# Patient Record
Sex: FEMALE | Race: WHITE
[De-identification: names, ages, dates, MRNs, and addresses within clinical notes are randomized per-mention and may not be internally consistent; named-entity substitution may affect disease eponyms.]

---

## 2017-09-11 ENCOUNTER — HOSPITAL ENCOUNTER (INPATIENT)
Dept: HOSPITAL 62 - ER | Age: 22
LOS: 4 days | Discharge: HOME | DRG: 684 | End: 2017-09-15
Attending: INTERNAL MEDICINE | Admitting: FAMILY MEDICINE
Payer: COMMERCIAL

## 2017-09-11 DIAGNOSIS — Z88.1: ICD-10-CM

## 2017-09-11 DIAGNOSIS — K21.9: ICD-10-CM

## 2017-09-11 DIAGNOSIS — D64.9: ICD-10-CM

## 2017-09-11 DIAGNOSIS — N17.9: Primary | ICD-10-CM

## 2017-09-11 DIAGNOSIS — Z87.11: ICD-10-CM

## 2017-09-11 DIAGNOSIS — Z91.018: ICD-10-CM

## 2017-09-11 DIAGNOSIS — J45.909: ICD-10-CM

## 2017-09-11 DIAGNOSIS — E87.5: ICD-10-CM

## 2017-09-11 DIAGNOSIS — Z79.899: ICD-10-CM

## 2017-09-11 DIAGNOSIS — E86.0: ICD-10-CM

## 2017-09-11 LAB
ALBUMIN SERPL-MCNC: 4.6 G/DL (ref 3.5–5)
ALP SERPL-CCNC: 94 U/L (ref 38–126)
ALT SERPL-CCNC: 28 U/L (ref 9–52)
ANION GAP SERPL CALC-SCNC: 16 MMOL/L (ref 5–19)
APPEARANCE UR: CLEAR
AST SERPL-CCNC: 31 U/L (ref 14–36)
BASOPHILS # BLD AUTO: 0 10^3/UL (ref 0–0.2)
BASOPHILS NFR BLD AUTO: 0.4 % (ref 0–2)
BILIRUB DIRECT SERPL-MCNC: 0.4 MG/DL (ref 0–0.4)
BILIRUB SERPL-MCNC: 0.7 MG/DL (ref 0.2–1.3)
BILIRUB UR QL STRIP: NEGATIVE
BUN SERPL-MCNC: 30 MG/DL (ref 7–20)
CALCIUM: 9.9 MG/DL (ref 8.4–10.2)
CHLORIDE SERPL-SCNC: 101 MMOL/L (ref 98–107)
CO2 SERPL-SCNC: 23 MMOL/L (ref 22–30)
CREAT SERPL-MCNC: 3.92 MG/DL (ref 0.52–1.25)
EOSINOPHIL # BLD AUTO: 0.3 10^3/UL (ref 0–0.6)
EOSINOPHIL NFR BLD AUTO: 2.2 % (ref 0–6)
ERYTHROCYTE [DISTWIDTH] IN BLOOD BY AUTOMATED COUNT: 14.6 % (ref 11.5–14)
GLUCOSE SERPL-MCNC: 86 MG/DL (ref 75–110)
GLUCOSE UR STRIP-MCNC: NEGATIVE MG/DL
HCT VFR BLD CALC: 38.7 % (ref 36–47)
HGB BLD-MCNC: 12.5 G/DL (ref 12–15.5)
HGB HCT DIFFERENCE: -1.2
KETONES UR STRIP-MCNC: (no result) MG/DL
LIPASE SERPL-CCNC: 155.5 U/L (ref 23–300)
LYMPHOCYTES # BLD AUTO: 2.7 10^3/UL (ref 0.5–4.7)
LYMPHOCYTES NFR BLD AUTO: 20.5 % (ref 13–45)
MCH RBC QN AUTO: 28.9 PG (ref 27–33.4)
MCHC RBC AUTO-ENTMCNC: 32.2 G/DL (ref 32–36)
MCV RBC AUTO: 90 FL (ref 80–97)
MONOCYTES # BLD AUTO: 1.3 10^3/UL (ref 0.1–1.4)
MONOCYTES NFR BLD AUTO: 10.1 % (ref 3–13)
NEUTROPHILS # BLD AUTO: 8.6 10^3/UL (ref 1.7–8.2)
NEUTS SEG NFR BLD AUTO: 66.8 % (ref 42–78)
NITRITE UR QL STRIP: NEGATIVE
PH UR STRIP: 5 [PH] (ref 5–9)
POTASSIUM SERPL-SCNC: 4.9 MMOL/L (ref 3.6–5)
PROT SERPL-MCNC: 7.9 G/DL (ref 6.3–8.2)
PROT UR STRIP-MCNC: NEGATIVE MG/DL
RBC # BLD AUTO: 4.31 10^6/UL (ref 3.72–5.28)
SODIUM SERPL-SCNC: 140.3 MMOL/L (ref 137–145)
SP GR UR STRIP: 1.01
UROBILINOGEN UR-MCNC: NEGATIVE MG/DL (ref ?–2)
WBC # BLD AUTO: 12.9 10^3/UL (ref 4–10.5)

## 2017-09-11 PROCEDURE — 83735 ASSAY OF MAGNESIUM: CPT

## 2017-09-11 PROCEDURE — 93976 VASCULAR STUDY: CPT

## 2017-09-11 PROCEDURE — 94640 AIRWAY INHALATION TREATMENT: CPT

## 2017-09-11 PROCEDURE — 99285 EMERGENCY DEPT VISIT HI MDM: CPT

## 2017-09-11 PROCEDURE — 85379 FIBRIN DEGRADATION QUANT: CPT

## 2017-09-11 PROCEDURE — 82803 BLOOD GASES ANY COMBINATION: CPT

## 2017-09-11 PROCEDURE — 84703 CHORIONIC GONADOTROPIN ASSAY: CPT

## 2017-09-11 PROCEDURE — 76770 US EXAM ABDO BACK WALL COMP: CPT

## 2017-09-11 PROCEDURE — 96375 TX/PRO/DX INJ NEW DRUG ADDON: CPT

## 2017-09-11 PROCEDURE — 96361 HYDRATE IV INFUSION ADD-ON: CPT

## 2017-09-11 PROCEDURE — 81001 URINALYSIS AUTO W/SCOPE: CPT

## 2017-09-11 PROCEDURE — 85025 COMPLETE CBC W/AUTO DIFF WBC: CPT

## 2017-09-11 PROCEDURE — 80053 COMPREHEN METABOLIC PANEL: CPT

## 2017-09-11 PROCEDURE — 83880 ASSAY OF NATRIURETIC PEPTIDE: CPT

## 2017-09-11 PROCEDURE — A9567 TECHNETIUM TC-99M AEROSOL: HCPCS

## 2017-09-11 PROCEDURE — A9540 TC99M MAA: HCPCS

## 2017-09-11 PROCEDURE — 80048 BASIC METABOLIC PNL TOTAL CA: CPT

## 2017-09-11 PROCEDURE — 36600 WITHDRAWAL OF ARTERIAL BLOOD: CPT

## 2017-09-11 PROCEDURE — 96376 TX/PRO/DX INJ SAME DRUG ADON: CPT

## 2017-09-11 PROCEDURE — 96374 THER/PROPH/DIAG INJ IV PUSH: CPT

## 2017-09-11 PROCEDURE — 84100 ASSAY OF PHOSPHORUS: CPT

## 2017-09-11 PROCEDURE — 71020: CPT

## 2017-09-11 PROCEDURE — 36415 COLL VENOUS BLD VENIPUNCTURE: CPT

## 2017-09-11 PROCEDURE — 76380 CAT SCAN FOLLOW-UP STUDY: CPT

## 2017-09-11 PROCEDURE — 93005 ELECTROCARDIOGRAM TRACING: CPT

## 2017-09-11 PROCEDURE — 78582 LUNG VENTILAT&PERFUS IMAGING: CPT

## 2017-09-11 PROCEDURE — 83690 ASSAY OF LIPASE: CPT

## 2017-09-11 PROCEDURE — 93010 ELECTROCARDIOGRAM REPORT: CPT

## 2017-09-11 NOTE — ER DOCUMENT REPORT
ED GI/





- General


Chief Complaint: Abdominal Pain


Stated Complaint: STOMACH/BACK PAIN


Time Seen by Provider: 09/11/17 22:11


Mode of Arrival: Ambulatory


Information source: Patient


Notes: 





Patient presents complaining of nausea and abdominal cramps that started 

yesterday.  Patient denies any vomiting or diarrhea.  Patient denies any fever.

  Patient states that she was recently placed on Cipro to treat a dental 

infection although stopped it yesterday when her symptoms started.  Patient 

states that she had been taking Cipro 750 mg twice a day for 4 days.  Patient 

states she was given pain medication in triage and now her pain has resolved.


TRAVEL OUTSIDE OF THE [place] IN LAST 30 DAYS: No





- HPI


Patient complains to provider of: Abdominal pain.  No: Diarrhea, Dysuria, 

Vaginal bleeding, Vaginal discharge, Vomiting


Onset: Yesterday


Timing/Duration: Gradual


Quality of pain: Achy


Severity at maximum: Moderate


Pain Level: Denies


Location: Low back, Other - Generalized abdomen


Vaginal bleeding (Compared to normal period): None


Associated symptoms: Nausea.  denies: Diarrhea, Dysuria, Fever, Loss of appetite

, Urinary hesitancy, Urinary frequency, Urinary retention, Urinary urgency, 

Vaginal discharge


Exacerbated by: Denies


Relieved by: Denies


Similar symptoms previously: No


Recently seen / treated by doctor: No





- Related Data


Allergies/Adverse Reactions: 


 





amoxicillin Allergy (Verified 09/11/17 20:55)


 


clindamycin Allergy (Verified 09/11/17 20:55)


 


Penicillins Allergy (Verified 09/11/17 20:55)


 











Past Medical History





- General


Information source: Patient





- Social History


Smoking Status: Former Smoker


Chew tobacco use (# tins/day): No


Frequency of alcohol use: Social


Drug Abuse: None


Occupation: Dialysis technician


Family History: Reviewed & Not Pertinent


Patient has suicidal ideation: No


Patient has homicidal ideation: No


Pulmonary Medical History: Reports: Hx Asthma


Neurological Medical History: Reports: Hx Migraine


Renal/ Medical History: Denies: Hx Peritoneal Dialysis


GI Medical History: Reports: Hx Gastroesophageal Reflux Disease


Past Surgical History: Reports: Hx Nose Surgery





- Immunizations


Hx Diphtheria, Pertussis, Tetanus Vaccination: Yes





Review of Systems





- Review of Systems


Constitutional: No symptoms reported.  denies: Fever, Recent illness


EENT: No symptoms reported


Cardiovascular: No symptoms reported.  denies: Chest pain


Respiratory: No symptoms reported.  denies: Cough, Short of breath


Gastrointestinal: Abdominal pain, Nausea.  denies: Diarrhea, Vomiting


Genitourinary: Flank pain.  denies: Dysuria


Female Genitourinary: No symptoms reported


Musculoskeletal: Back pain


Skin: No symptoms reported


Hematologic/Lymphatic: No symptoms reported


Neurological/Psychological: No symptoms reported





Physical Exam





- Vital signs


Vitals: 


 











Temp Pulse Resp BP Pulse Ox


 


 98.7 F   75   16   129/82 H  100 


 


 09/11/17 20:56  09/11/17 20:56  09/11/17 20:56  09/11/17 20:56  09/11/17 20:56














- General


General appearance: Appears well, Alert


In distress: None





- HEENT


Head: Normocephalic, Atraumatic


Eyes: Normal


Conjunctiva: Normal


Nasal: Normal


Mouth/Lips: Normal


Mucous membranes: Normal


Neck: Normal, Supple.  No: Lymphadenopathy





- Respiratory


Respiratory status: No respiratory distress


Chest status: Nontender


Breath sounds: Normal.  No: Rales, Rhonchi, Stridor, Wheezing


Chest palpation: Normal





- Cardiovascular


Rhythm: Regular


Heart sounds: S1 appreciated, S2 appreciated


Murmur: No





- Abdominal


Inspection: Obese


Distension: No distension


Bowel sounds: Normal


Tenderness: Nontender


Organomegaly: No organomegaly





- Back


Back: Normal, Nontender.  No: CVA tenderness





- Extremities


General upper extremity: Normal inspection, Normal ROM


General lower extremity: Normal inspection, Normal ROM





- Neurological


Neuro grossly intact: Yes


Cognition: Normal


[place] Coma Scale Eye Opening: Spontaneous


[place] Coma Scale Verbal: Oriented


[place] Coma Scale Motor: Obeys Commands


[place] Coma Scale Total: 15





- Psychological


Associated symptoms: Normal affect, Normal mood





- Skin


Skin Temperature: Warm


Skin Moisture: Dry


Skin Color: Normal





Course





- Re-evaluation


Re-evalutation: 





09/11/17 23:15


Consulted with Dr. Ray regarding patient presentation and diagnostic test 

results.  Recommends giving patient 3 L of IV fluids and then repeating her 

chemistry panel.


09/12/17 00:07


IVF infusing, BS CTA, pt denies complaints at this time.


09/12/17 00:53


Patient states that her abdominal pain and back pain have returned but are off 

and on at this time.  Patient requesting additional medicine.


09/12/17 02:00\


call placed to dr Millan, he will return call when available





09/12/17 04:53


call placed to dr Millan, he will return call when available


09/12/17 05:40


Consulted with Dr. Millan regarding patient presentation, agrees to accept 

patient for admission to [facility]





- Vital Signs


Vital signs: 


 











Temp Pulse Resp BP Pulse Ox


 


 98.0 F   73   16   134/73 H  98 


 


 09/12/17 00:44  09/12/17 03:35  09/12/17 03:35  09/12/17 03:35  09/12/17 03:35














- Laboratory


Result Diagrams: 


 09/11/17 20:35





 09/12/17 01:20


Laboratory results interpreted by me: 


 











  09/11/17 09/11/17 09/11/17





  20:35 20:35 20:35


 


WBC  12.9 H  


 


RDW  14.6 H  


 


Absolute Neutrophils  8.6 H  


 


Chloride   


 


BUN   30 H 


 


Creatinine   3.92 H 


 


Est GFR ( Amer)   18 L 


 


Est GFR (Non-Af Amer)   14 L 


 


Urine Ketones    TRACE H


 


Urine Blood    SMALL H














  09/12/17





  01:20


 


WBC 


 


RDW 


 


Absolute Neutrophils 


 


Chloride  108 H


 


BUN  26 H


 


Creatinine  3.34 H


 


Est GFR ( Amer)  21 L


 


Est GFR (Non-Af Amer)  17 L


 


Urine Ketones 


 


Urine Blood 














- Diagnostic Test


Radiology reviewed: Reports reviewed





Discharge





- Discharge


Clinical Impression: 


 CASSIE (acute kidney injury)





Condition: Stable


Disposition: ADMITTED AS INPATIENT


Admitting Provider: Hospitalist


Unit Admitted: [facility]

## 2017-09-11 NOTE — RADIOLOGY REPORT (SQ)
EXAM DESCRIPTION:  CT LTD RENAL STONE PROTOCOL ON



COMPLETED DATE/TIME:  9/11/2017 10:34 pm



REASON FOR STUDY:  bilat flank pain, CASSIE



COMPARISON:  None.



TECHNIQUE:  CT scan of the abdomen and pelvis performed without intravenous or oral contrast. Images 
reviewed with lung, soft tissue, and bone windows. Reconstructed coronal and sagittal MPR images revi
ewed. All images stored on PACS.

All CT scanners at this facility use dose modulation, iterative reconstruction, and/or weight based d
osing when appropriate to reduce radiation dose to as low as reasonably achievable (ALARA).

[facility]: Dose Right  [facility]: CareDose    [facility]: Dose Right    [facility]: Teradose 4D    [facility]: Smart ShopKeep POS



RADIATION DOSE:  Up-to-date CT equipment and radiation dose reduction techniques were employed. CTDIv
ol: 13.8 mGy. DLP: 700 mGy-cm.mGy.



LIMITATIONS:  None.



FINDINGS:  LOWER CHEST: No significant findings. No nodules or infiltrates.

NON-CONTRASTED LIVER, SPLEEN, ADRENALS: Evaluation limited by lack of IV contrast. No identified sign
ificant masses.

PANCREAS: No masses. No peripancreatic inflammatory changes.

GALLBLADDER: No identified stones by CT criteria. No inflammatory changes to suggest cholecystitis.

RIGHT KIDNEY AND URETER: No suspicious masses. Assessment limited by lack of IV contrast.   No signif
icant calcifications.   No hydronephrosis or hydroureter.

LEFT KIDNEY AND URETER: No suspicious masses. Assessment limited by lack of IV contrast.   No signifi
cant calcifications.   No hydronephrosis or hydroureter.

AORTA AND RETROPERITONEUM: No aneurysm. No retroperitoneal masses or adenopathy.

BOWEL AND PERITONEAL CAVITY: No obvious masses or inflammatory changes. No free fluid.

APPENDIX: Normal.

PELVIS, BLADDER, AND ABDOMINAL WALL:Age-appropriate appearance of the uterus and ovaries.  Trace cul-
de-sac free fluid. Bladder normal.

BONES: No significant findings.

OTHER: No other significant finding.



IMPRESSION:  NO SIGNIFICANT OR ACUTE PROCESS IN THE ABDOMEN OR PELVIS.



COMMENT:  Quality ID # [id number]: Final reports with documentation of one or more dose reduction techniques
 (e.g., Automated exposure control, adjustment of the mA and/or kV according to patient size, use of 
iterative reconstruction technique)



TECHNICAL DOCUMENTATION:  JOB ID:  [id number]

 2011 [employer]- All Rights Reserved

## 2017-09-12 LAB
ANION GAP SERPL CALC-SCNC: 11 MMOL/L (ref 5–19)
ANION GAP SERPL CALC-SCNC: 9 MMOL/L (ref 5–19)
BASOPHILS # BLD AUTO: 0.1 10^3/UL (ref 0–0.2)
BASOPHILS NFR BLD AUTO: 0.5 % (ref 0–2)
BUN SERPL-MCNC: 25 MG/DL (ref 7–20)
BUN SERPL-MCNC: 26 MG/DL (ref 7–20)
CALCIUM: 9.1 MG/DL (ref 8.4–10.2)
CALCIUM: 9.3 MG/DL (ref 8.4–10.2)
CHLORIDE SERPL-SCNC: 108 MMOL/L (ref 98–107)
CHLORIDE SERPL-SCNC: 108 MMOL/L (ref 98–107)
CO2 SERPL-SCNC: 24 MMOL/L (ref 22–30)
CO2 SERPL-SCNC: 24 MMOL/L (ref 22–30)
CREAT SERPL-MCNC: 3.18 MG/DL (ref 0.52–1.25)
CREAT SERPL-MCNC: 3.34 MG/DL (ref 0.52–1.25)
EOSINOPHIL # BLD AUTO: 0.3 10^3/UL (ref 0–0.6)
EOSINOPHIL NFR BLD AUTO: 2.6 % (ref 0–6)
ERYTHROCYTE [DISTWIDTH] IN BLOOD BY AUTOMATED COUNT: 14.1 % (ref 11.5–14)
GLUCOSE SERPL-MCNC: 85 MG/DL (ref 75–110)
GLUCOSE SERPL-MCNC: 96 MG/DL (ref 75–110)
HCT VFR BLD CALC: 35.3 % (ref 36–47)
HGB BLD-MCNC: 11.6 G/DL (ref 12–15.5)
HGB HCT DIFFERENCE: -0.5
LYMPHOCYTES # BLD AUTO: 3.1 10^3/UL (ref 0.5–4.7)
LYMPHOCYTES NFR BLD AUTO: 29.2 % (ref 13–45)
MAGNESIUM SERPL-MCNC: 2.1 MG/DL (ref 1.6–2.3)
MCH RBC QN AUTO: 29.4 PG (ref 27–33.4)
MCHC RBC AUTO-ENTMCNC: 32.7 G/DL (ref 32–36)
MCV RBC AUTO: 90 FL (ref 80–97)
MONOCYTES # BLD AUTO: 1.2 10^3/UL (ref 0.1–1.4)
MONOCYTES NFR BLD AUTO: 11.1 % (ref 3–13)
NEUTROPHILS # BLD AUTO: 5.9 10^3/UL (ref 1.7–8.2)
NEUTS SEG NFR BLD AUTO: 56.6 % (ref 42–78)
PHOSPHATE SERPL-MCNC: 5.6 MG/DL (ref 2.5–4.5)
POTASSIUM SERPL-SCNC: 4.1 MMOL/L (ref 3.6–5)
POTASSIUM SERPL-SCNC: 4.4 MMOL/L (ref 3.6–5)
RBC # BLD AUTO: 3.94 10^6/UL (ref 3.72–5.28)
SODIUM SERPL-SCNC: 140.5 MMOL/L (ref 137–145)
SODIUM SERPL-SCNC: 143.1 MMOL/L (ref 137–145)
WBC # BLD AUTO: 10.5 10^3/UL (ref 4–10.5)

## 2017-09-12 RX ADMIN — DOCUSATE SODIUM SCH MG: 100 CAPSULE, LIQUID FILLED ORAL at 17:43

## 2017-09-12 RX ADMIN — Medication SCH ML: at 13:21

## 2017-09-12 RX ADMIN — HEPARIN SODIUM SCH UNIT: 5000 INJECTION, SOLUTION INTRAVENOUS; SUBCUTANEOUS at 22:18

## 2017-09-12 RX ADMIN — PROMETHAZINE HYDROCHLORIDE PRN MG: 25 TABLET ORAL at 15:01

## 2017-09-12 RX ADMIN — ACETAMINOPHEN PRN MG: 325 TABLET ORAL at 14:59

## 2017-09-12 RX ADMIN — DOCUSATE SODIUM SCH MG: 100 CAPSULE, LIQUID FILLED ORAL at 10:45

## 2017-09-12 RX ADMIN — HEPARIN SODIUM SCH UNIT: 5000 INJECTION, SOLUTION INTRAVENOUS; SUBCUTANEOUS at 10:46

## 2017-09-12 RX ADMIN — Medication SCH ML: at 22:13

## 2017-09-12 RX ADMIN — HYDROCODONE BITARTRATE AND ACETAMINOPHEN PRN TAB: 5; 325 TABLET ORAL at 18:55

## 2017-09-12 NOTE — PDOC H&P
History of Present Illness


Admission Date/PCP: 


  09/12/17 05:49





  


PCP None


Patient complains of: nausea, abd cramps


History of Present Illness: 


RICHA FORRESTER is a 21 year old  female with underlying eczema, reflux

, right knee pain, history of peptic ulcer disease, mild anxiety depression, 

without suicidal or homicidal ideation, but no known urinary tract problems who 

presents to the emergency room for evaluation of approximately 24 hour history 

of nausea and primarily upper abdominal cramping.  Nothing in particular made 

the pain worse.  Given pain medication in triage, with resolution of the pain.





No vomiting, fever chills, or diarrhea.





Started on Cipro to treat a dental infection.  Stop this medication yesterday 

when her symptoms started.  Took this twice a day.





No darkening of her urine.  No dysuria.  No prior urinary tract infections.








Patient has been discussed with emergency room nurse practitioner who evaluated 

the patient. 


 


 


 


Dictation via voice recognition software.











Laboratory results are listed in [place] and are reviewed. 


 


 


X-ray summary results are listed below, with full report(s) reviewed. .


 


 


 


Social history/personal habits: Single.  No children.  Works as a dialysis 

technician.  No tobacco or illicit drug use.  4-5 beers per weekend when out 

with friends.


 


 


 


Allergies/adverse reactions are listed in [place] and are reviewed. 


 


 


Home medications none


 





 





REVIEW OF SYSTEMS: 


 


Constitutional: No fever or chills.


 


Eyes: No vision complaints.


 


ENT: No swallowing problems or complaints.  Denies hearing loss.


 


Pulmonary: No current complaints.


 


Cardiovascular: No current complaints, including chest pain.


 


Gastrointestinal: See history and present illness.


 


Skin: No current complaints, including rashes.


 


Hematologic: Easy bruising.


 


Neurologic: No current complaints, including numbness or tingling.


 


Musculoskeletal: Intermittent right knee pain, without diagnosis of arthritis.


 


Psychiatric: Mild anxiety and depression.  Denies suicidal or homicidal 

ideation.


 


Endocrine: No current complaints, including polyuria.


 


Genitourinary: No current complaints, including dysuria.


 


 


PHYSICAL EXAMINATION:


 


5 feet 1 inches tall.  88 kg.  BMI 36.7 kg/m. Temperature 98.0.  Pulse 74 and 

regular.  Blood pressure 132/94.  Respirations are 15 and unlabored.  100% 

saturation on room air.





Obese otherwise well-developed young  female who appears approximately 

her stated age.  Pleasant awake alert and cooperative.  No obvious distress 

other than somewhat anxious.





Mother is listening in on cell phone; patient aware.





Female emergency room nurse Fifi is present.


 


Skin is warm and dry.  No grossly obvious evidence of rash in areas of skin 

examined.  No subcutaneous nodules palpated.


 


ENT: Hearing grossly normal to normal conversation.  Tongue midline on 

protrusion pink and moist.


 


Eyes: No scleral icterus.  Pupils equal and reactive to light at 4 mm.  Pink 

conjunctivae.


 


Neck is supple and nontender to gentle active range of motion and palpation.  

Midline trachea.  No palpable thyroid nodule mass enlargement or tenderness.


 


Lymphatic: No palpable cervical or clavicular nodes.


 


Neck and lymphatic exams limited by patient body habitus.


 


Psychiatric: Reasonable insight into acute and chronic medical issues.  

Oriented to time location and why here.


 


Lungs: Auscultation reveals clear and equal breath sounds bilaterally.  No use 

of accessory respiratory muscles.


 


Cardiovascular: Heart regular rate and rhythm, without gallop murmur or rub.  

No carotid or abdominal aortic bruits. No ankle or pedal edema. Faintly 

palpable dorsalis pedis pulses.


 


Abdomen:soft obese basically nontender other than scant upper abdominal 

discomfort to palpation, with positive bowel sounds.  Unable to adequately 

evaluate abdomen for masses or organomegaly due to body habitus. 


 


Extremities: Feet are warm and dry.  No calf tenderness to compression.  No 

grossly obvious visual evidence of calf swelling.  Gentle manipulation of lower 

extremities fails to reveal any obvious evidence of injury or instability to 

knees hips or ankles.


 


Neurologic: Moves upper extremities grossly normally.  Patellar reflexes 

absent.  Absent Babinski.  Light touch is intact at feet.  Dorsiflexion and 

plantarflexion of feet 5 / 5 and symmetric.


 











Past Medical History


Cardiac Medical History: 


   Denies: Atrial Fibrillation, Congestive Heart Failure, Coronary Artery 

Disease, DVT, Myocardial Infarction, Hyperlipidema, Hypertension, Pulmonary 

Embolism


Pulmonary Medical History: Reports: Asthma - Has not bothered her for years


   Denies: Chronic Obstructive Pulmonary Disease (COPD), Sleep Apnea


EENT Medical History: 


   Denies: Eyes, Ears, Throat


Neurological Medical History: Reports: Migraine - Occasional


   Denies: Hemorrhagic CVA, Ischemic CVA, Seizures


Endocrine Medical History: 


   Denies: Diabetes Mellitus Type 1, Diabetes Mellitus Type 2, Hyperthyroidism, 

Hypothyroidism


Renal/ Medical History: Reports: None


GI Medical History: Reports: Gastroesophageal Reflux Disease


   Denies: Cirrhosis, Hepatitis, Peptic Ulcer Disease


Musculoskeltal Medical History: Reports: Other - Intermittent right knee pain


Skin Medical History: Reports: Eczema


Psychiatric Medical History: Reports: Depression, General Anxiety Disorder


   Denies: Alcohol Dependency, Substance Abuse, Tobacco Dependency


Hematology: Reports: Other - Easy bruising


Infectious Medical History: 


   Denies: Hepatitis B, Hepatitis C





Past Surgical History


Past Surgical History: Reports: Tonsillectomy, Other - Nasal surgery





Social History


Information Source: Patient, Relative - Mother listening in on cell phone, 

Emergency Med Personnel, [facility] Records


Smoking Status: Unknown if Ever Smoked


Frequency of Alcohol Use: Social


Drugs: None





- Advance Directive


Resuscitation Status: Full Code


Surrogate healthcare decision maker:: 





Mother





Family History


Family History: Reviewed & Not Pertinent


Parental Family History Reviewed: Yes - Mother healthy; uncertain health status 

of father


Children Family History Reviewed: NA


Sibling(s) Family History Reviewed.: Yes - Healthy





Medication/Allergy


Allergies/Adverse Reactions: 


 





amoxicillin Allergy (Verified 09/11/17 20:55)


 


clindamycin Allergy (Verified 09/11/17 20:55)


 


Penicillins Allergy (Verified 09/11/17 20:55)


 


shellfish derived Allergy (Verified 09/12/17 07:11)


 











Physical Exam


Vital Signs: 


 











Temp Pulse Resp BP Pulse Ox


 


 98.0 F   73   18   123/72   97 


 


 09/12/17 00:44  09/12/17 03:35  09/12/17 07:01  09/12/17 07:01  09/12/17 07:01














Results


Impressions: 


 





Limited or Localized CT  09/11/17 22:22


IMPRESSION:  NO SIGNIFICANT OR ACUTE PROCESS IN THE ABDOMEN OR PELVIS.


 














Assessment & Plan





- Diagnosis


(1) ARF (acute renal failure)


Qualifiers: 


   Acute renal failure type: unspecified   Qualified Code(s): N17.9 - Acute 

kidney failure, unspecified   


Is this a current diagnosis for this admission?: Yes   


Plan: 


Likely an adverse effect of Cipro.  Continue IV fluid.  Serial chemistry.  

Nephrology consult.





I have strongly encouraged patient to be careful getting out of bed, to avoid a 

fall with injury.


 


Knee high SCDs for DVT prophylaxis, along with subcutaneous heparin.





Impression and plans were discussed with patient and mother, both of whom 

concur.


 


Time spent in evaluation and management of patient: 67   minutes.








(2) Abdominal pain


Qualifiers: 


   Abdominal location: upper abdomen, unspecified   Qualified Code(s): R10.10 - 

Upper abdominal pain, unspecified   


Is this a current diagnosis for this admission?: Yes   


Plan: 


As needed pain medication








(3) DVT prophylaxis


Is this a current diagnosis for this admission?: Yes   





(4) Nausea


Is this a current diagnosis for this admission?: Yes   


Plan: 


As needed Phenergan








- Time


Time Spent: 50 to 70 Minutes


Medications reviewed and adjusted accordingly: Yes


Anticipated discharge: Home


Within: within 72 hours





- Inpatient Certification


Based on my medical assessment, after consideration of the patient's 

comorbidities, presenting symptoms, or acuity I expect that the services needed 

warrant INPATIENT care.: Yes


I certify that my determination is in accordance with my understanding of 

Medicare's requirements for reasonable and necessary INPATIENT services [42 CFR 

412.3e].: Yes


Medical Necessity: Need Close Monitoring Due to Risk of Patient Decompensation, 

Need For IV Fluids, Risk of Complication if Not Cared For in Hospital, Risk of 

Diagnosis Which Will Require Inpatient Eval/Care/Monitoring


Post Hospital Care: D/C or Transfer Summary

## 2017-09-12 NOTE — RADIOLOGY REPORT (SQ)
EXAM DESCRIPTION:  U/S RETROPERITON (RENAL/AORTA)



COMPLETED DATE/TIME:  9/12/2017 9:05 pm



REASON FOR STUDY:  acute renal failure



COMPARISON:  CT dated 9/11/2017



TECHNIQUE:  Dynamic and static grayscale images acquired of the kidneys and bladder and recorded on P
ACS. Additional selected color Doppler and spectral images recorded.



LIMITATIONS:  Body habitus.



FINDINGS:  RIGHT KIDNEY: Normal size. Normal echogenicity. No solid or suspicious masses. No hydronep
hrosis. No calcifications.

LEFT KIDNEY:  Normal size. Normal echogenicity. No solid or suspicious masses. No hydronephrosis. No 
calcifications.

BLADDER: No masses.

OTHER FINDINGS: No other significant finding.



IMPRESSION:  NORMAL RENAL AND BLADDER ULTRASOUND.



TECHNICAL DOCUMENTATION:  JOB ID:  [id number]

 2011 [employer]- All Rights Reserved

## 2017-09-12 NOTE — PDOC PROGRESS REPORT
Subjective


Progress Note for:: 09/12/17


Subjective:: 


The patient is resting in her bed with her sister at the bedside.  She states 

that she is feeling a little bit better and was able to eat some lunch this 

afternoon.  She has been seen by nephrology who wants to continue increased IV 

fluids and is recommended a renal ultrasound.  Overall she states that her 

nausea has improved.  No vomiting.  She does not feel as weak and dizzy as she 

did when she came into the hospital.  The pain that she was having is 

improving. No chest pain, shortness of breath or heart palpitations.  She has 

not had a bowel movement today.  She is making good urine and has no dysuria, 

frequency or hematuria.   





Physical Exam


Vital Signs: 


 











Temp Pulse Resp BP Pulse Ox


 


 97.7 F   71   12   128/68 H  100 


 


 09/12/17 11:48  09/12/17 14:00  09/12/17 11:48  09/12/17 11:48  09/12/17 11:48








 Intake & Output











 09/11/17 09/12/17 09/13/17





 06:59 06:59 06:59


 


Intake Total   240


 


Balance   240


 


Weight   92.164 kg











General appearance: PRESENT: no acute distress, well-developed, well-nourished


Head exam: PRESENT: atraumatic, normocephalic


Mouth exam: PRESENT: moist, tongue midline


Respiratory exam: PRESENT: clear to auscultation hadley.  ABSENT: rales, rhonchi, 

wheezes


Cardiovascular exam: PRESENT: RRR.  ABSENT: diastolic murmur, rubs, systolic 

murmur


GI/Abdominal exam: PRESENT: normal bowel sounds, soft.  ABSENT: distended, 

guarding, mass, organolmegaly, rebound, tenderness


Rectal exam: PRESENT: deferred


Extremities exam: PRESENT: full ROM.  ABSENT: calf tenderness, clubbing, pedal 

edema


Musculoskeletal exam: PRESENT: ambulatory


Neurological exam: PRESENT: alert, awake, oriented to person, oriented to place

, oriented to time, oriented to situation, CN II-XII grossly intact.  ABSENT: 

motor sensory deficit


Psychiatric exam: PRESENT: appropriate affect, normal mood.  ABSENT: homicidal 

ideation, suicidal ideation


Skin exam: PRESENT: dry, intact, warm.  ABSENT: cyanosis, rash





Results


Laboratory Results: 


 





 09/12/17 07:25 





 09/12/17 07:25 





 











  09/12/17 09/12/17





  07:25 07:25


 


WBC  10.5 


 


RBC  3.94 


 


Hgb  11.6 L 


 


Hct  35.3 L 


 


MCV  90 


 


MCH  29.4 


 


MCHC  32.7 


 


RDW  14.1 H 


 


Plt Count  235 


 


Seg Neutrophils %  56.6 


 


Lymphocytes %  29.2 


 


Monocytes %  11.1 


 


Eosinophils %  2.6 


 


Basophils %  0.5 


 


Absolute Neutrophils  5.9 


 


Absolute Lymphocytes  3.1 


 


Absolute Monocytes  1.2 


 


Absolute Eosinophils  0.3 


 


Absolute Basophils  0.1 


 


Sodium   140.5


 


Potassium   4.4


 


Chloride   108 H


 


Carbon Dioxide   24


 


Anion Gap   9


 


BUN   25 H


 


Creatinine   3.18 H


 


Est GFR ( Amer)   22 L


 


Est GFR (Non-Af Amer)   18 L


 


Glucose   85


 


Calcium   9.1


 


Phosphorus   5.6 H


 


Magnesium   2.1











Impressions: 


 





Limited or Localized CT  09/11/17 22:22


IMPRESSION:  NO SIGNIFICANT OR ACUTE PROCESS IN THE ABDOMEN OR PELVIS.


 














Assessment & Plan





- Diagnosis


(1) ARF (acute renal failure)


Qualifiers: 


   Acute renal failure type: unspecified   Qualified Code(s): N17.9 - Acute 

kidney failure, unspecified   


Is this a current diagnosis for this admission?: Yes   


Plan: 


Likely secondary to acute tubular necrosis possibly due to treatment with Cipro 

and Motrin.  I have increased her IV fluids 175 cc an hour.  She will have a 

renal ultrasound performed.  We will check a chemistry panel in the morning.  

Nephrology has seen the patient and we certainly appreciate their input.








(2) Abdominal pain


Qualifiers: 


   Abdominal location: upper abdomen, unspecified   Qualified Code(s): R10.10 - 

Upper abdominal pain, unspecified   


Is this a current diagnosis for this admission?: Yes   


Plan: 


Secondary to acute renal failure.  Improving








(3) Anemia


Plan: 


Likely secondary to hemodilution.  She will have a CBC drawn  in the morning.








(4) Nausea


Is this a current diagnosis for this admission?: Yes   


Plan: 


Secondary to acute renal failure.  Improving.  She does have antiemetics 

available as needed.








- Time


Time Spent with patient: 15-24 minutes





- Inpatient Certification


Medical Necessity: Need For IV Fluids - Inpatient hospitalization remains 

necessary.  This young lady has acute renal failure requiring aggressive IV 

fluid hydration.  Timing of disposition will be determined by her clinical 

course and when nephrology feels this if it is safe for her to go home.

## 2017-09-12 NOTE — PDOC CONSULTATION
Consultation


Consult Date: 09/12/17


Consult reason:: CASSIE





History of Present Illness


Admission Date/PCP: 


  09/12/17 07:03





  





History of Present Illness: 


RICHA FORRESTER is a 21 year old  female with underlying eczema, reflux

, right knee pain, history of peptic ulcer disease, mild anxiety depression, 

without suicidal or homicidal ideation, but no known urinary tract problems who 

presents to the emergency room for evaluation of approximately 24 hour history 

of nausea and primarily upper abdominal cramping. Pain was found to be mostly 

in her lower abdomen. She also had it radiating to her lower back. Prior to 

this event she was started on the antibiotic cipro for prophylaxis of a dental 

procedure. When she came to the ER she was found to have a creatinine of 3.9. 

She was given NS at a rate of 175mL an hour. She also had an abdominal CT with 

stone protocol done, which showed no stones or abnormalities. This morning her 

creatinine is down to 3.2. She denies any change in the way the urine looks, 

she did have a minor decrease in production. The urine production since then 

has resumed back to normal. No history of kidney related disease like lupus, 

FSGS, nephritis or nephrotic syndrome. No family history of PKD.





Past Medical History


Cardiac Medical History: 


   Denies: Atrial Fibrillation, Coronary Artery Disease, DVT, Hyperlipidemia, 

Myocardial Infarction, Pulmonary Embolism


Pulmonary Medical History: Reports: Asthma - Has not bothered her for years


   Denies: Chronic Obstructive Pulmonary Disease (COPD), Sleep Apnea


EENT Medical History: Reports: Other - Easy bruising


   Denies: Eyes, Ears, Throat


Neurological Medical History: Reports: Migraine - Occasional


   Denies: Hemorrhagic CVA, Ischemic CVA, Seizures


Endocrine Medical History: 


   Denies: Diabetes Mellitus Type 1, Diabetes Mellitus Type 2, Hyperthyroidism, 

Hypothyroidism


Renal/ Medical History: Reports: None


GI Medical History: Reports: Gastroesophageal Reflux Disease


   Denies: Cirrhosis, Hepatitis, Peptic Ulcer Disease


Musculoskeltal Medical History: Reports: Other - Intermittent right knee pain


Skin Medical History: Reports: Eczema


Psychiatric Medical History: Reports: Depression, General Anxiety Disorder


   Denies: Alcohol Dependency, Substance Abuse, Tobacco Dependency


Infectious Medical History: 


   Denies: Hepatitis B, Hepatitis C





Past Surgical History


Past Surgical History: Reports: Tonsillectomy, Other - Nasal surgery





Social History


Smoking Status: Never Smoker


Frequency of Alcohol Use: Social


Hx Recreational Drug Use: No


Drugs: None


Hx Prescription Drug Abuse: No





- Advance Directive


Resuscitation Status: Full Code





Family History


Parental Family History Reviewed: No


Children Family History Reviewed: No


Sibling(s) Family History Reviewed.: No





Medication/Allergy


Home Medications: 








No Home Medications  09/12/17 








Allergies/Adverse Reactions: 


 





Iodinated Contrast- Oral and IV Dye Allergy (Severe, Verified 09/12/17 09:43)


 Anaphylaxis


peanut Allergy (Severe, Verified 09/12/17 09:43)


 Anaphylaxis


Penicillins Allergy (Severe, Verified 09/12/17 09:43)


 Anaphylaxis


shellfish derived Allergy (Severe, Verified 09/12/17 09:43)


 Anaphylaxis


strawberry Allergy (Severe, Verified 09/12/17 09:43)


 Anaphylaxis


amoxicillin Allergy (Intermediate, Verified 09/12/17 09:43)


 Vomiting


cefaclor [From Ceclor] Allergy (Verified 09/12/17 07:26)


 


clindamycin Allergy (Verified 09/12/17 09:43)


 


erythromycin base Allergy (Verified 09/12/17 07:26)


 


ketchup Allergy (Severe, Uncoded 09/12/17 09:43)


 Anaphylaxis











Review of Systems


Eyes: ABSENT: visual disturbances


Ears: ABSENT: hearing changes


Nose, Mouth, and Throat: ABSENT: headache(s), sore throat


Cardiovascular: ABSENT: chest pain, dyspnea on exertion, edema, orthropnea, 

palpitations


Respiratory: ABSENT: cough, dyspnea, sputum


Gastrointestinal: ABSENT: abdominal pain, constipation, diarrhea, hematemesis, 

hematochezia, nausea, vomiting


Genitourinary: ABSENT: difficulty urinating, dysuria, hematuria


Musculoskeletal: ABSENT: deformity, joint swelling, muscle weakness


Integumentary: ABSENT: rash, wounds


Neurological: ABSENT: abnormal gait, abnormal speech, confusion, dizziness, 

focal weakness, syncope


Psychiatric: PRESENT: anxiety, depression


Endocrine: ABSENT: polydipsia, polyuria





Physical Exam


Vital Signs: 


 











Temp Pulse Resp BP Pulse Ox


 


 98.8 F   64   14   131/70 H  100 


 


 09/12/17 09:17  09/12/17 11:26  09/12/17 11:26  09/12/17 09:17 09/12/17 11:26








 Intake & Output











 09/11/17 09/12/17 09/13/17





 06:59 06:59 06:59


 


Weight   92.164 kg











General appearance: PRESENT: no acute distress, well-developed, well-nourished


Head exam: PRESENT: atraumatic, normocephalic


Mouth exam: PRESENT: moist, tongue midline


Neck exam: PRESENT: full ROM.  ABSENT: JVD


Respiratory exam: PRESENT: clear to auscultation hadley.  ABSENT: accessory muscle 

use, chest wall tenderness


Cardiovascular exam: PRESENT: RRR, +S1, +S2


GI/Abdominal exam: PRESENT: normal bowel sounds, soft.  ABSENT: distended, 

guarding, organomegaly, rebound, tenderness


Extremities exam: ABSENT: joint swelling, pedal edema, tenderness


Musculoskeletal exam: PRESENT: normal inspection.  ABSENT: deformity, tenderness


Neurological exam: PRESENT: alert, awake, oriented to person, oriented to place

, oriented to time, oriented to situation


Psychiatric exam: PRESENT: appropriate affect, normal mood


Skin exam: PRESENT: dry, intact, warm.  ABSENT: cyanosis, rash





Results


Laboratory Results: 


 





 09/12/17 07:25 





 09/12/17 07:25 





 











  09/12/17 09/12/17





  07:25 07:25


 


WBC  10.5 


 


RBC  3.94 


 


Hgb  11.6 L 


 


Hct  35.3 L 


 


MCV  90 


 


MCH  29.4 


 


MCHC  32.7 


 


RDW  14.1 H 


 


Plt Count  235 


 


Seg Neutrophils %  56.6 


 


Lymphocytes %  29.2 


 


Monocytes %  11.1 


 


Eosinophils %  2.6 


 


Basophils %  0.5 


 


Absolute Neutrophils  5.9 


 


Absolute Lymphocytes  3.1 


 


Absolute Monocytes  1.2 


 


Absolute Eosinophils  0.3 


 


Absolute Basophils  0.1 


 


Sodium   140.5


 


Potassium   4.4


 


Chloride   108 H


 


Carbon Dioxide   24


 


Anion Gap   9


 


BUN   25 H


 


Creatinine   3.18 H


 


Est GFR ( Amer)   22 L


 


Est GFR (Non-Af Amer)   18 L


 


Glucose   85


 


Calcium   9.1


 


Phosphorus   5.6 H


 


Magnesium   2.1











Impressions: 


 





Limited or Localized CT  09/11/17 22:22


IMPRESSION:  NO SIGNIFICANT OR ACUTE PROCESS IN THE ABDOMEN OR PELVIS.


 














Assessment & Plan





- Diagnosis


(1) Anemia


Plan: 


Will follow up with anemia lab work up if it is going down. Most likely 

dilutional with how much NS that she is receiving








(2) ARF (acute renal failure)


Qualifiers: 


   Acute renal failure type: unspecified   Qualified Code(s): N17.9 - Acute 

kidney failure, unspecified   


Is this a current diagnosis for this admission?: Yes   


Plan: 


Looks to be improving. Most likely from the use of cipro. Most likely not due 

to a kidney stone. No stone was found on CT. Also does not look to be post 

obstructive. Unlikely to be from dehydration due to how hydrated she stays. UA 

shows no protein so less likely from nephritis or nephrotic syndrome. 





Continue on NS at current rate. Patient is producing good urine out put.








(3) Abdominal pain


Qualifiers: 


   Abdominal location: upper abdomen, unspecified   Qualified Code(s): R10.10 - 

Upper abdominal pain, unspecified   


Is this a current diagnosis for this admission?: Yes   


Plan: 


Controlled by pain meds.








(4) Nausea


Is this a current diagnosis for this admission?: Yes   


Plan: 


Resolved

## 2017-09-13 LAB
ANION GAP SERPL CALC-SCNC: 9 MMOL/L (ref 5–19)
BASE EXCESS BLDA CALC-SCNC: -0.3 MMOL/L
BASOPHILS # BLD AUTO: 0.1 10^3/UL (ref 0–0.2)
BASOPHILS NFR BLD AUTO: 0.6 % (ref 0–2)
BUN SERPL-MCNC: 19 MG/DL (ref 7–20)
CALCIUM: 9 MG/DL (ref 8.4–10.2)
CHLORIDE SERPL-SCNC: 107 MMOL/L (ref 98–107)
CO2 SERPL-SCNC: 25 MMOL/L (ref 22–30)
CREAT SERPL-MCNC: 2.59 MG/DL (ref 0.52–1.25)
EOSINOPHIL # BLD AUTO: 0.3 10^3/UL (ref 0–0.6)
EOSINOPHIL NFR BLD AUTO: 3.6 % (ref 0–6)
ERYTHROCYTE [DISTWIDTH] IN BLOOD BY AUTOMATED COUNT: 14.2 % (ref 11.5–14)
GLUCOSE SERPL-MCNC: 83 MG/DL (ref 75–110)
HCT VFR BLD CALC: 34.8 % (ref 36–47)
HGB BLD-MCNC: 11.7 G/DL (ref 12–15.5)
HGB HCT DIFFERENCE: 0.3
LYMPHOCYTES # BLD AUTO: 2 10^3/UL (ref 0.5–4.7)
LYMPHOCYTES NFR BLD AUTO: 24.4 % (ref 13–45)
MAGNESIUM SERPL-MCNC: 1.9 MG/DL (ref 1.6–2.3)
MCH RBC QN AUTO: 29.8 PG (ref 27–33.4)
MCHC RBC AUTO-ENTMCNC: 33.6 G/DL (ref 32–36)
MCV RBC AUTO: 89 FL (ref 80–97)
MONOCYTES # BLD AUTO: 0.9 10^3/UL (ref 0.1–1.4)
MONOCYTES NFR BLD AUTO: 11.2 % (ref 3–13)
NEUTROPHILS # BLD AUTO: 4.9 10^3/UL (ref 1.7–8.2)
NEUTS SEG NFR BLD AUTO: 60.2 % (ref 42–78)
PHOSPHATE SERPL-MCNC: 5.1 MG/DL (ref 2.5–4.5)
POTASSIUM SERPL-SCNC: 5.3 MMOL/L (ref 3.6–5)
RBC # BLD AUTO: 3.92 10^6/UL (ref 3.72–5.28)
SAO2 % BLDA: 60.9 % (ref 94–98)
SODIUM SERPL-SCNC: 141.1 MMOL/L (ref 137–145)
WBC # BLD AUTO: 8.1 10^3/UL (ref 4–10.5)

## 2017-09-13 RX ADMIN — Medication SCH ML: at 21:45

## 2017-09-13 RX ADMIN — PROMETHAZINE HYDROCHLORIDE PRN MG: 25 TABLET ORAL at 12:44

## 2017-09-13 RX ADMIN — DOCUSATE SODIUM SCH MG: 100 CAPSULE, LIQUID FILLED ORAL at 18:16

## 2017-09-13 RX ADMIN — SODIUM CHLORIDE PRN ML: 9 INJECTION, SOLUTION INTRAVENOUS at 12:13

## 2017-09-13 RX ADMIN — Medication SCH ML: at 05:23

## 2017-09-13 RX ADMIN — DOCUSATE SODIUM SCH MG: 100 CAPSULE, LIQUID FILLED ORAL at 09:45

## 2017-09-13 RX ADMIN — Medication SCH ML: at 13:29

## 2017-09-13 RX ADMIN — HYDROCODONE BITARTRATE AND ACETAMINOPHEN PRN TAB: 5; 325 TABLET ORAL at 04:28

## 2017-09-13 RX ADMIN — SODIUM CHLORIDE PRN ML: 9 INJECTION, SOLUTION INTRAVENOUS at 00:16

## 2017-09-13 RX ADMIN — HYDROCODONE BITARTRATE AND ACETAMINOPHEN PRN TAB: 5; 325 TABLET ORAL at 17:12

## 2017-09-13 RX ADMIN — SODIUM CHLORIDE PRN ML: 9 INJECTION, SOLUTION INTRAVENOUS at 18:33

## 2017-09-13 RX ADMIN — HEPARIN SODIUM SCH UNIT: 5000 INJECTION, SOLUTION INTRAVENOUS; SUBCUTANEOUS at 09:45

## 2017-09-13 RX ADMIN — HEPARIN SODIUM SCH UNIT: 5000 INJECTION, SOLUTION INTRAVENOUS; SUBCUTANEOUS at 21:50

## 2017-09-13 RX ADMIN — ACETAMINOPHEN PRN MG: 325 TABLET ORAL at 13:37

## 2017-09-13 NOTE — RADIOLOGY REPORT (SQ)
EXAM DESCRIPTION:  CHEST PA/LAT



COMPLETED DATE/TIME:  9/13/2017 2:20 pm



REASON FOR STUDY:  Chest pain



COMPARISON:  None.



EXAM PARAMETERS:  NUMBER OF VIEWS: two views

TECHNIQUE: Digital Frontal and Lateral radiographic views of the chest acquired.

RADIATION DOSE: NA

LIMITATIONS: none



FINDINGS:  LUNGS AND PLEURA: No opacities, masses or pneumothorax. No pleural effusion.

MEDIASTINUM AND HILAR STRUCTURES: No masses or contour abnormalities.

HEART AND VASCULAR STRUCTURES: Heart normal size.  No evidence for failure.

BONES: No acute findings.

HARDWARE: None in the chest.

OTHER: No other significant finding.



IMPRESSION:  NO SIGNIFICANT RADIOGRAPHIC FINDING IN THE CHEST.



TECHNICAL DOCUMENTATION:  JOB ID:  [id number]

 2011 [employer]- All Rights Reserved

## 2017-09-13 NOTE — PDOC PROGRESS REPORT
Subjective


Progress Note for:: 09/13/17


Subjective:: 


At the time of seeing the patient she was complaining of chest pain. According 

to her it felt like a heavy pressure. It did not radiate anywhere. When she 

gets pain medication it makes her feel better. It is not associated with SOB or 

diaphoresis. EKG and chest x-ray did not show anything abnormal. She is 

producing more urine. She produced 2.1L  yesterday. Denies any change in the 

color of the urine or blood in her urine.





Physical Exam


Vital Signs: 


 











Temp Pulse Resp BP Pulse Ox


 


 97.7 F   89   16   126/76 H  100 


 


 09/13/17 07:55  09/13/17 14:00  09/13/17 12:20  09/13/17 07:55  09/13/17 12:20








 Intake & Output











 09/12/17 09/13/17 09/14/17





 06:59 06:59 06:59


 


Intake Total  4065 


 


Output Total  2100 


 


Balance  1965 


 


Weight  92.164 kg 











General appearance: PRESENT: mild distress - -in mild pain, well-developed, well

-nourished


Head exam: PRESENT: atraumatic, normocephalic


Mouth exam: PRESENT: moist, neck supple, tongue midline


Neck exam: PRESENT: full ROM.  ABSENT: JVD, tracheal deviation


Respiratory exam: PRESENT: clear to auscultation hadley.  ABSENT: accessory muscle 

use, chest wall tenderness


Cardiovascular exam: PRESENT: RRR, +S1, +S2


GI/Abdominal exam: PRESENT: normal bowel sounds, soft.  ABSENT: distended, 

guarding, organomegaly, rebound, tenderness


Extremities exam: PRESENT: full ROM.  ABSENT: calf tenderness, pedal edema


Musculoskeletal exam: PRESENT: normal inspection.  ABSENT: deformity, tenderness


Neurological exam: PRESENT: alert, awake, oriented to person, oriented to place

, oriented to time, oriented to situation


Psychiatric exam: PRESENT: appropriate affect, normal mood


Skin exam: PRESENT: dry, intact, warm.  ABSENT: cyanosis, rash





Results


Laboratory Results: 


 





 09/13/17 08:18 





 09/13/17 08:18 





 











  09/13/17 09/13/17





  08:18 08:18


 


WBC  8.1 


 


RBC  3.92 


 


Hgb  11.7 L 


 


Hct  34.8 L 


 


MCV  89 


 


MCH  29.8 


 


MCHC  33.6 


 


RDW  14.2 H 


 


Plt Count  212 


 


Seg Neutrophils %  60.2 


 


Lymphocytes %  24.4 


 


Monocytes %  11.2 


 


Eosinophils %  3.6 


 


Basophils %  0.6 


 


Absolute Neutrophils  4.9 


 


Absolute Lymphocytes  2.0 


 


Absolute Monocytes  0.9 


 


Absolute Eosinophils  0.3 


 


Absolute Basophils  0.1 


 


Sodium   141.1


 


Potassium   5.3 H


 


Chloride   107


 


Carbon Dioxide   25


 


Anion Gap   9


 


BUN   19


 


Creatinine   2.59 H


 


Est GFR ( Amer)   28 L


 


Est GFR (Non-Af Amer)   23 L


 


Glucose   83


 


Calcium   9.0


 


Phosphorus   5.1 H


 


Magnesium   1.9








 











  09/13/17





  06:35


 


NT-Pro-B Natriuret Pep  827 H











Impressions: 


 





Limited or Localized CT  09/11/17 22:22


IMPRESSION:  NO SIGNIFICANT OR ACUTE PROCESS IN THE ABDOMEN OR PELVIS.


 








Renal Ultrasound  09/12/17 00:00


IMPRESSION:  NORMAL RENAL AND BLADDER ULTRASOUND.


 








Chest X-Ray  09/13/17 00:00


IMPRESSION:  NO SIGNIFICANT RADIOGRAPHIC FINDING IN THE CHEST.


 














Assessment & Plan





- Diagnosis


(1) Anemia


Plan: 


Stable








(2) ARF (acute renal failure)


Qualifiers: 


   Acute renal failure type: unspecified   Qualified Code(s): N17.9 - Acute 

kidney failure, unspecified   


Is this a current diagnosis for this admission?: Yes   


Plan: 


Looks to be improving. Continue on NS at current rate. Patient is producing 

good urine out put. Will get a renal artery doppler of the kidneys to rule out 

renal artery disease








(3) Abdominal pain


Qualifiers: 


   Abdominal location: upper abdomen, unspecified   Qualified Code(s): R10.10 - 

Upper abdominal pain, unspecified   


Is this a current diagnosis for this admission?: Yes   


Plan: 


resolved








(4) Nausea


Is this a current diagnosis for this admission?: Yes   


Plan: 


resolved

## 2017-09-13 NOTE — PDOC PROGRESS REPORT
Subjective


Progress Note for:: 09/13/17


Subjective:: 


The patient is a pleasant 21  female with a past medical history 

significant for eczema, gastroesophageal reflux disease and history of peptic 

ulcer disease.  She also suffers from mild anxiety and depression.  She works 

at [employer].  She presented to the emergency room with a 24 hour 

here history of nausea and abdominal cramping.  The pain radiated into her 

back.  She had recently been treated with p.o. Cipro for a dental procedure and 

was given Motrin to take for pain.  In the emergency room she was found to have 

creatinine of 3.9.  She had a CT scan of the abdomen and pelvis with stone 

protocol performed which revealed no evidence of kidney stone or any 

abnormalities.  She was referred for admission.  She was started on aggressive 

IV fluids and all nephrotoxic medications have been held.  She was seen 

yesterday by nephrology who is following along here in the hospital.  





Today her creatinine is down to 2.59.  She had a renal ultrasound performed 

yesterday which was unremarkable as well.


Today when I saw the patient since she is resting comfortably in the bed.  She 

states that she has had no fever or chills overnight.  No chest pain, shortness 

of breath or heart palpitations.  She has had no further episodes of nausea.  

No vomiting.  She continues to have back pain in the area of the right kidney.  

This is requiring hydrocodone for control.  She has no abdominal pain.  No 

dysuria or hematuria.  She does complain of urinary frequency due to aggressive 

IV fluids.








Physical Exam


Vital Signs: 


 











Temp Pulse Resp BP Pulse Ox


 


 97.7 F   67   12   126/76 H  98 


 


 09/13/17 07:55  09/13/17 07:55  09/13/17 07:55  09/13/17 07:55  09/13/17 07:55








 Intake & Output











 09/12/17 09/13/17 09/14/17





 06:59 06:59 06:59


 


Intake Total  4065 


 


Output Total  2100 


 


Balance  1965 


 


Weight  92.164 kg 











General appearance: PRESENT: no acute distress, cooperative, well-developed, 

well-nourished


Head exam: PRESENT: atraumatic, normocephalic


Mouth exam: PRESENT: moist, tongue midline


Respiratory exam: PRESENT: clear to auscultation hadley.  ABSENT: rales, rhonchi, 

wheezes


Cardiovascular exam: PRESENT: RRR.  ABSENT: diastolic murmur, rubs, systolic 

murmur


GI/Abdominal exam: PRESENT: normal bowel sounds, soft, other - She does have 

right flank pain CVA tenderness..  ABSENT: distended, guarding, mass, 

organolmegaly, rebound, tenderness


Rectal exam: PRESENT: deferred


Extremities exam: PRESENT: full ROM.  ABSENT: calf tenderness, clubbing, pedal 

edema


Musculoskeletal exam: PRESENT: ambulatory


Neurological exam: PRESENT: alert, awake, oriented to person, oriented to place

, oriented to time, oriented to situation, CN II-XII grossly intact.  ABSENT: 

motor sensory deficit


Psychiatric exam: PRESENT: appropriate affect, normal mood.  ABSENT: homicidal 

ideation, suicidal ideation


Skin exam: PRESENT: dry, intact, warm.  ABSENT: cyanosis, rash





Results


Laboratory Results: 


 





 09/13/17 08:18 





 09/13/17 08:18 





 











  09/13/17 09/13/17





  08:18 08:18


 


WBC  8.1 


 


RBC  3.92 


 


Hgb  11.7 L 


 


Hct  34.8 L 


 


MCV  89 


 


MCH  29.8 


 


MCHC  33.6 


 


RDW  14.2 H 


 


Plt Count  212 


 


Seg Neutrophils %  60.2 


 


Lymphocytes %  24.4 


 


Monocytes %  11.2 


 


Eosinophils %  3.6 


 


Basophils %  0.6 


 


Absolute Neutrophils  4.9 


 


Absolute Lymphocytes  2.0 


 


Absolute Monocytes  0.9 


 


Absolute Eosinophils  0.3 


 


Absolute Basophils  0.1 


 


Sodium   141.1


 


Potassium   5.3 H


 


Chloride   107


 


Carbon Dioxide   25


 


Anion Gap   9


 


BUN   19


 


Creatinine   2.59 H


 


Est GFR ( Amer)   28 L


 


Est GFR (Non-Af Amer)   23 L


 


Glucose   83


 


Calcium   9.0


 


Phosphorus   5.1 H


 


Magnesium   1.9











Impressions: 


 





Limited or Localized CT  09/11/17 22:22


IMPRESSION:  NO SIGNIFICANT OR ACUTE PROCESS IN THE ABDOMEN OR PELVIS.


 








Renal Ultrasound  09/12/17 00:00


IMPRESSION:  NORMAL RENAL AND BLADDER ULTRASOUND.


 














Assessment & Plan





- Diagnosis


(1) ARF (acute renal failure)


Qualifiers: 


   Acute renal failure type: unspecified   Qualified Code(s): N17.9 - Acute 

kidney failure, unspecified   


Is this a current diagnosis for this admission?: Yes   


Plan: 


Likely secondary to acute tubular necrosis possibly due to treatment with Cipro 

and Motrin.  I increased her IV fluids 175 cc an hour yesterday .  Her renal 

ultrasound was negative as well as CT scan of the abdomen and pelvis for any 

acute abnormalities.  She is being followed by nephrology and we certainly 

appreciate their input.  She will have a chemistry panel drawn in the morning.








(2) Abdominal pain


Qualifiers: 


   Abdominal location: upper abdomen, unspecified   Qualified Code(s): R10.10 - 

Upper abdominal pain, unspecified   


Is this a current diagnosis for this admission?: Yes   


Plan: 


Secondary to acute renal failure.  Improving








(3) Anemia


Plan: 


Likely secondary to hemodilution.  She will have a CBC drawn  in the morning.








(4) Nausea


Is this a current diagnosis for this admission?: Yes   


Plan: 


Secondary to acute renal failure.  Resolved.  She has no complaints of being 

nauseated overnight.  She does have antiemetics available as needed.








(5) Hyperkalemia


Plan: 


This is quite mild and secondary to acute renal failure.  Nephrology is 

following.








- Time


Time Spent with patient: 15-24 minutes





- Inpatient Certification


Medical Necessity: Need For IV Fluids - Inpatient hospitalization remains 

necessary.  The patient still has acute renal failure although her creatinine 

is improving.  She requires further parenteral fluids and evaluation by 

nephrology.  Hopefully she can be discharged home in the next 24-48 hours of 

her creatinine continues to improve.

## 2017-09-14 LAB
ANION GAP SERPL CALC-SCNC: 12 MMOL/L (ref 5–19)
BUN SERPL-MCNC: 14 MG/DL (ref 7–20)
CALCIUM: 9.1 MG/DL (ref 8.4–10.2)
CHLORIDE SERPL-SCNC: 106 MMOL/L (ref 98–107)
CO2 SERPL-SCNC: 25 MMOL/L (ref 22–30)
CREAT SERPL-MCNC: 1.76 MG/DL (ref 0.52–1.25)
GLUCOSE SERPL-MCNC: 85 MG/DL (ref 75–110)
MAGNESIUM SERPL-MCNC: 1.7 MG/DL (ref 1.6–2.3)
PHOSPHATE SERPL-MCNC: 4.7 MG/DL (ref 2.5–4.5)
POTASSIUM SERPL-SCNC: 4.5 MMOL/L (ref 3.6–5)
SODIUM SERPL-SCNC: 142.8 MMOL/L (ref 137–145)

## 2017-09-14 RX ADMIN — HEPARIN SODIUM SCH UNIT: 5000 INJECTION, SOLUTION INTRAVENOUS; SUBCUTANEOUS at 11:01

## 2017-09-14 RX ADMIN — SODIUM CHLORIDE PRN ML: 9 INJECTION, SOLUTION INTRAVENOUS at 18:04

## 2017-09-14 RX ADMIN — ALBUTEROL SULFATE SCH MG: 2.5 SOLUTION RESPIRATORY (INHALATION) at 14:04

## 2017-09-14 RX ADMIN — Medication SCH ML: at 13:09

## 2017-09-14 RX ADMIN — METOCLOPRAMIDE HYDROCHLORIDE SCH MG: 10 TABLET ORAL at 21:21

## 2017-09-14 RX ADMIN — Medication SCH ML: at 05:18

## 2017-09-14 RX ADMIN — DOCUSATE SODIUM SCH MG: 100 CAPSULE, LIQUID FILLED ORAL at 17:27

## 2017-09-14 RX ADMIN — ACETAMINOPHEN PRN MG: 325 TABLET ORAL at 16:32

## 2017-09-14 RX ADMIN — METOCLOPRAMIDE HYDROCHLORIDE SCH MG: 10 TABLET ORAL at 11:53

## 2017-09-14 RX ADMIN — HYDROCODONE BITARTRATE AND ACETAMINOPHEN PRN TAB: 5; 325 TABLET ORAL at 06:29

## 2017-09-14 RX ADMIN — ALBUTEROL SULFATE SCH MG: 2.5 SOLUTION RESPIRATORY (INHALATION) at 20:55

## 2017-09-14 RX ADMIN — METOCLOPRAMIDE HYDROCHLORIDE SCH MG: 10 TABLET ORAL at 16:01

## 2017-09-14 RX ADMIN — Medication SCH ML: at 21:14

## 2017-09-14 RX ADMIN — DOCUSATE SODIUM SCH MG: 100 CAPSULE, LIQUID FILLED ORAL at 11:01

## 2017-09-14 RX ADMIN — HEPARIN SODIUM SCH UNIT: 5000 INJECTION, SOLUTION INTRAVENOUS; SUBCUTANEOUS at 21:20

## 2017-09-14 RX ADMIN — HYDROCODONE BITARTRATE AND ACETAMINOPHEN PRN TAB: 5; 325 TABLET ORAL at 00:35

## 2017-09-14 NOTE — PDOC PROGRESS REPORT
Subjective


Progress Note for:: 09/14/17


Subjective:: 


Patient reports having some chest pain associated with her asthma last night.  

Also complains of nausea.





Physical Exam


Vital Signs: 


 











Temp Pulse Resp BP Pulse Ox


 


 98.1 F   91   16   129/75 H  97 


 


 09/14/17 11:33  09/14/17 14:04  09/14/17 14:04  09/14/17 11:33  09/14/17 14:04








 Intake & Output











 09/13/17 09/14/17 09/15/17





 06:59 06:59 06:59


 


Intake Total 4065 7773 


 


Output Total 2100 1900 


 


Balance 1965 5873 


 


Weight 92.164 kg  











General appearance: PRESENT: no acute distress


Eye exam: PRESENT: conjunctiva pink.  ABSENT: scleral icterus


Mouth exam: PRESENT: moist, tongue midline


Neck exam: ABSENT: JVD


Respiratory exam: PRESENT: clear to auscultation hadley.  ABSENT: rales, rhonchi, 

wheezes


Cardiovascular exam: PRESENT: RRR.  ABSENT: diastolic murmur, rubs, systolic 

murmur


GI/Abdominal exam: PRESENT: normal bowel sounds, soft.  ABSENT: distended, 

guarding, mass, organolmegaly, rebound, tenderness


Extremities exam: ABSENT: calf tenderness, clubbing, pedal edema


Neurological exam: PRESENT: alert, awake, oriented to person, oriented to place

, oriented to time, oriented to situation, CN II-XII grossly intact.  ABSENT: 

motor sensory deficit


Psychiatric exam: PRESENT: appropriate affect


Skin exam: PRESENT: dry, intact, warm.  ABSENT: cyanosis, rash





Results


Laboratory Results: 


 





 09/13/17 08:18 





 09/14/17 06:17 





 











  09/13/17 09/14/17





  21:18 06:17


 


Carbonic Acid  1.50 H 


 


HCO3/H2CO3 Ratio  17:1 


 


ABG pH  7.34 L 


 


ABG pCO2  49.7 H 


 


ABG pO2  34.0 L* 


 


ABG HCO3  26.0 


 


ABG O2 Saturation  60.9 L 


 


ABG Base Excess  -0.3 


 


FiO2  21% 


 


Sodium   142.8


 


Potassium   4.5


 


Chloride   106


 


Carbon Dioxide   25


 


Anion Gap   12


 


BUN   14


 


Creatinine   1.76 H


 


Est GFR ( Amer)   44 L


 


Est GFR (Non-Af Amer)   36 L


 


Glucose   85


 


Calcium   9.1


 


Phosphorus   4.7 H


 


Magnesium   1.7








 











  09/13/17





  06:35


 


NT-Pro-B Natriuret Pep  827 H











Impressions: 


 





Limited or Localized CT  09/11/17 22:22


IMPRESSION:  NO SIGNIFICANT OR ACUTE PROCESS IN THE ABDOMEN OR PELVIS.


 








Renal Ultrasound  09/12/17 00:00


IMPRESSION:  NORMAL RENAL AND BLADDER ULTRASOUND.


 








Chest X-Ray  09/13/17 00:00


IMPRESSION:  NO SIGNIFICANT RADIOGRAPHIC FINDING IN THE CHEST.


 








Lung Scan-VQ NM  09/14/17 00:00


IMPRESSION:  NORMAL VENTILATION-PERFUSION LUNG SCAN.  NEGATIVE FOR PULMONARY 

EMBOLI.


 








Vascular Ultrasound  09/14/17 01:00


IMPRESSION:  NO DOPPLER EVIDENCE OF HEMODYNAMICALLY SIGNIFICANT RENAL ARTERY 

STENOSIS.


 














Assessment & Plan





- Diagnosis


(1) ARF (acute renal failure)


Qualifiers: 


   Acute renal failure type: unspecified   Qualified Code(s): N17.9 - Acute 

kidney failure, unspecified   


Is this a current diagnosis for this admission?: Yes   


Plan: 


Patient continues to improve with IV fluids.








(2) Abdominal pain


Qualifiers: 


   Abdominal location: upper abdomen, unspecified   Qualified Code(s): R10.10 - 

Upper abdominal pain, unspecified   


Is this a current diagnosis for this admission?: Yes   


Plan: 


Patient reports that she has not moved her bowels and has had some reflux type 

symptoms.  Will start on Reglan.








(3) Anemia


Is this a current diagnosis for this admission?: Yes   


Plan: 


Hemoglobin remained stable.








(4) Chest pain


Is this a current diagnosis for this admission?: Yes   


Plan: 


Most likely related to her asthma.  Will start on albuterol nebulizers








(5) Hyperkalemia


Is this a current diagnosis for this admission?: Yes   


Plan: 


Resolved








(6) Nausea


Is this a current diagnosis for this admission?: Yes   


Plan: 


Is likely secondary to gastroesophageal reflux disease.  We will give Reglan.








- Time


Time Spent with patient: 25-34 minutes

## 2017-09-14 NOTE — RADIOLOGY REPORT (SQ)
EXAM DESCRIPTION:  U/S LTD DUPLEX ART/JORGE FLOW



COMPLETED DATE/TIME:  9/14/2017 6:34 am



REASON FOR STUDY:  Flank pain/visualize renal arteries using doppler



COMPARISON:  Ultrasound renal, 9/12/2017.  CT renal, 9/11/2017.



TECHNIQUE:  Realtime and static grayscale images acquired. Selected color Doppler, velocities and spe
ctral images recorded.



LIMITATIONS:  None.



FINDINGS:  RIGHT KIDNEY:

RENAL ARTERY VELOCITIES: 112 cm/sec.  Segmental artery velocity 49 cm/sec.

RENAL VEIN:  Color doppler flow present, patent.

VELOCITY RATIO: 2.0.  Normal waveforms.

KIDNEY:  Normal size.   No significant pathology.

LEFT KIDNEY:

RENAL ARTERY VELOCITIES: 46 cm/sec.  Segmental artery velocity 45 cm/sec.

RENAL VEIN:  Color doppler flow present, patent.

VELOCITY RATIO: 0.8. Normal waveforms.

KIDNEY:  Normal size.   No significant pathology.

BLADDER: Normal.

OTHER: No other significant finding.



IMPRESSION:  NO DOPPLER EVIDENCE OF HEMODYNAMICALLY SIGNIFICANT RENAL ARTERY STENOSIS.



COMMENT:  NORMAL RENAL ARTERY/AORTA VELOCITY RATIO IS LESS THAN OR EQUAL TO 3.5.



TECHNICAL DOCUMENTATION:  JOB ID:  [id number]

 2011 [employer]- All Rights Reserved

## 2017-09-14 NOTE — PDOC PROGRESS REPORT
Subjective


Progress Note for:: 09/14/17


Subjective:: 


Patient continues to have chest pain.  Earlier this morning the chest pain was 

associated with shortness of breath.  Patient does have a history of anxiety 

and asthma.  She denies this being like any previous asthma attacks or anxiety 

attacks.  Chest pain is not able to be reproduced by palpation. Currently at 

the time of examination she is not short of breath.  She did state that she was 

tired because she was having trouble breathing while sleeping.  She produced 

1.9 L of urine yesterday








Physical Exam


Vital Signs: 


 











Temp Pulse Resp BP Pulse Ox


 


 97.8 F   89   12   126/71 H  95 


 


 09/14/17 08:15  09/14/17 09:41  09/14/17 09:41  09/14/17 08:15  09/14/17 09:41








 Intake & Output











 09/13/17 09/14/17 09/15/17





 06:59 06:59 06:59


 


Intake Total 4065 7773 


 


Output Total 2100 1900 


 


Balance 1965 5873 


 


Weight 92.164 kg  











General appearance: PRESENT: mild distress, well-developed, well-nourished


Head exam: PRESENT: atraumatic, normocephalic


Mouth exam: PRESENT: moist, tongue midline


Neck exam: PRESENT: full ROM.  ABSENT: JVD, tracheal deviation


Respiratory exam: PRESENT: clear to auscultation hadley.  ABSENT: accessory muscle 

use, chest wall tenderness, crackles, decreased breath sounds, rhonchi, wheezes


Cardiovascular exam: PRESENT: RRR, +S1, +S2


GI/Abdominal exam: PRESENT: normal bowel sounds, soft.  ABSENT: distended, 

guarding, organomegaly, rebound, tenderness


Extremities exam: ABSENT: calf tenderness, joint swelling, pedal edema, 

tenderness


Musculoskeletal exam: PRESENT: normal inspection.  ABSENT: deformity, tenderness


Neurological exam: PRESENT: alert, awake, oriented to person, oriented to place

, oriented to time, oriented to situation


Psychiatric exam: PRESENT: appropriate affect, normal mood


Skin exam: PRESENT: dry, intact, warm.  ABSENT: cyanosis, rash





Results


Laboratory Results: 


 





 09/13/17 08:18 





 09/14/17 06:17 





 











  09/13/17 09/14/17





  21:18 06:17


 


Carbonic Acid  1.50 H 


 


HCO3/H2CO3 Ratio  17:1 


 


ABG pH  7.34 L 


 


ABG pCO2  49.7 H 


 


ABG pO2  34.0 L* 


 


ABG HCO3  26.0 


 


ABG O2 Saturation  60.9 L 


 


ABG Base Excess  -0.3 


 


FiO2  21% 


 


Sodium   142.8


 


Potassium   4.5


 


Chloride   106


 


Carbon Dioxide   25


 


Anion Gap   12


 


BUN   14


 


Creatinine   1.76 H


 


Est GFR ( Amer)   44 L


 


Est GFR (Non-Af Amer)   36 L


 


Glucose   85


 


Calcium   9.1


 


Phosphorus   4.7 H


 


Magnesium   1.7








 











  09/13/17





  06:35


 


NT-Pro-B Natriuret Pep  827 H











Impressions: 


 





Limited or Localized CT  09/11/17 22:22


IMPRESSION:  NO SIGNIFICANT OR ACUTE PROCESS IN THE ABDOMEN OR PELVIS.


 








Renal Ultrasound  09/12/17 00:00


IMPRESSION:  NORMAL RENAL AND BLADDER ULTRASOUND.


 








Chest X-Ray  09/13/17 00:00


IMPRESSION:  NO SIGNIFICANT RADIOGRAPHIC FINDING IN THE CHEST.


 








Vascular Ultrasound  09/14/17 01:00


IMPRESSION:  NO DOPPLER EVIDENCE OF HEMODYNAMICALLY SIGNIFICANT RENAL ARTERY 

STENOSIS.


 














Assessment & Plan





- Diagnosis


(1) Anemia


Plan: 


Stable








(2) ARF (acute renal failure)


Qualifiers: 


   Acute renal failure type: unspecified   Qualified Code(s): N17.9 - Acute 

kidney failure, unspecified   


Is this a current diagnosis for this admission?: Yes   


Plan: 


Looks to be improving. Decreasing NS to a rate of 100mL an hour. Patient is 

producing good urine out put. renal doppler was normal








(3) Chest pain


Plan: 


Is now associated with SOB, will order a V/Q scan to check for PE. ABG had 

minor hypercapnia. O2 sats changed from 100% to 95% with no change from room 

air.








- Notes


Notes: 


Patients kideny function is improving. Looks to be able to be followed up out 

patient. Will have her follow up a week after discharge with Dr. Lee or 

myself.





As far is the chest, will order VQ scan to rule out PE.

## 2017-09-14 NOTE — RADIOLOGY REPORT (SQ)
EXAM DESCRIPTION:  NM LUNG VENT/PERF SCAN



COMPLETED DATE/TIME:  9/14/2017 3:19 pm



REASON FOR STUDY:  Chest pain with increase SOB



COMPARISON:  Two-view chest 9/13/2017



RADIONUCLIDE AND DOSE:  5.1 millicuries TC-99m MAA  Intravenous

29.9 millicuries TC-99m DTPA Inhaled aerosol



TECHNIQUE:  Eight views of the lungs acquired post ventilation of DTPA aerosol.  Eight matching views
 of the lungs acquired following injection of MAA.



LIMITATIONS:  None.



FINDINGS:  VENTILATION: Symmetric and homogeneous distribution of DTPA aerosol during ventilatory pha
se.  No significant areas of photopenia.

PERFUSION: Perfusion images with normal homogenous activity and no wedge-shaped or segmental defects.
  No ventilation-perfusion mismatches.

OTHER: No other significant finding.



IMPRESSION:  NORMAL VENTILATION-PERFUSION LUNG SCAN.  NEGATIVE FOR PULMONARY EMBOLI.



TECHNICAL DOCUMENTATION:  JOB ID:  [id number]

 2011 [email or web address]- All Rights Reserved

## 2017-09-15 VITALS — DIASTOLIC BLOOD PRESSURE: 82 MMHG | SYSTOLIC BLOOD PRESSURE: 130 MMHG

## 2017-09-15 LAB
ANION GAP SERPL CALC-SCNC: 12 MMOL/L (ref 5–19)
BASOPHILS # BLD AUTO: 0.1 10^3/UL (ref 0–0.2)
BASOPHILS NFR BLD AUTO: 0.6 % (ref 0–2)
BUN SERPL-MCNC: 12 MG/DL (ref 7–20)
CALCIUM: 9.1 MG/DL (ref 8.4–10.2)
CHLORIDE SERPL-SCNC: 104 MMOL/L (ref 98–107)
CO2 SERPL-SCNC: 25 MMOL/L (ref 22–30)
CREAT SERPL-MCNC: 1.29 MG/DL (ref 0.52–1.25)
EOSINOPHIL # BLD AUTO: 0.1 10^3/UL (ref 0–0.6)
EOSINOPHIL NFR BLD AUTO: 1 % (ref 0–6)
ERYTHROCYTE [DISTWIDTH] IN BLOOD BY AUTOMATED COUNT: 14.4 % (ref 11.5–14)
GLUCOSE SERPL-MCNC: 91 MG/DL (ref 75–110)
HCT VFR BLD CALC: 33.4 % (ref 36–47)
HGB BLD-MCNC: 11.4 G/DL (ref 12–15.5)
HGB HCT DIFFERENCE: 0.8
LYMPHOCYTES # BLD AUTO: 1.8 10^3/UL (ref 0.5–4.7)
LYMPHOCYTES NFR BLD AUTO: 21.8 % (ref 13–45)
MCH RBC QN AUTO: 29.6 PG (ref 27–33.4)
MCHC RBC AUTO-ENTMCNC: 34.1 G/DL (ref 32–36)
MCV RBC AUTO: 87 FL (ref 80–97)
MONOCYTES # BLD AUTO: 0.8 10^3/UL (ref 0.1–1.4)
MONOCYTES NFR BLD AUTO: 9.7 % (ref 3–13)
NEUTROPHILS # BLD AUTO: 5.6 10^3/UL (ref 1.7–8.2)
NEUTS SEG NFR BLD AUTO: 66.9 % (ref 42–78)
POTASSIUM SERPL-SCNC: 3.8 MMOL/L (ref 3.6–5)
RBC # BLD AUTO: 3.85 10^6/UL (ref 3.72–5.28)
SODIUM SERPL-SCNC: 140.6 MMOL/L (ref 137–145)
WBC # BLD AUTO: 8.4 10^3/UL (ref 4–10.5)

## 2017-09-15 RX ADMIN — ACETAMINOPHEN PRN MG: 325 TABLET ORAL at 05:51

## 2017-09-15 RX ADMIN — Medication SCH ML: at 05:23

## 2017-09-15 RX ADMIN — METOCLOPRAMIDE HYDROCHLORIDE SCH MG: 10 TABLET ORAL at 08:24

## 2017-09-15 RX ADMIN — ALBUTEROL SULFATE SCH MG: 2.5 SOLUTION RESPIRATORY (INHALATION) at 08:16

## 2017-09-15 RX ADMIN — ALBUTEROL SULFATE SCH MG: 2.5 SOLUTION RESPIRATORY (INHALATION) at 02:01

## 2017-09-15 RX ADMIN — SODIUM CHLORIDE PRN ML: 9 INJECTION, SOLUTION INTRAVENOUS at 03:10

## 2017-09-15 NOTE — PDOC DISCHARGE SUMMARY
General





- Admit/Disc Date/PCP


Admission Date/Primary Care Provider: 


  09/12/17 07:03





  





Discharge Date: 09/15/17





- Discharge Diagnosis


(1) ARF (acute renal failure)


Is this a current diagnosis for this admission?: Yes   


Summary: 


Secondary to dehydration and possibly also ciprofloxacin.








(2) Abdominal pain


Is this a current diagnosis for this admission?: Yes   


Summary: 


Probably secondary to gastroesophageal reflux disease.








(3) Anemia


Is this a current diagnosis for this admission?: Yes   





(4) Chest pain


Is this a current diagnosis for this admission?: Yes   


Summary: 


Felt to be secondary to her underlying asthma.








(5) Hyperkalemia


Is this a current diagnosis for this admission?: Yes   





(6) Nausea


Is this a current diagnosis for this admission?: Yes   


Summary: 


Resolved with Reglan.








- Additional Information


Resuscitation Status: Full Code


Discharge Diet: Regular


Discharge Activity: Activity As Tolerated


Home Medications: 








Albuterol Sulfate [Proair HFA Inhalation Aerosol 8.5 gm MDI] 1 puff IH Q4 PRN #

1 mdi 09/15/17 


Hydrocodone/Acetaminophen [Norco 5-325 mg Tablet] 1 tab PO Q6HP PRN #10 tablet 

09/15/17 


Metoclopramide HCl [Reglan 10 mg Tablet] 5 mg PO ACHS PRN #10 tablet 09/15/17 


Promethazine HCl [Phenergan 25 mg Tablet] 12.5 mg PO Q6HP PRN #30 tablet 09/15/

17 











History of Present Illness


History of Present Illness: 


RICHA FORRESTER is a 21 year old female who has no history of renal problems who 

presented with a 24 history of nausea, upper abdominal cramping.  The patient 

had recently been started on ciprofloxacin for a dental infection.  The patient 

had not had any fevers or chills.  When she presented to emergency room she was 

found to have acute renal failure most likely secondary to decreased p.o. 

intake and dehydration.  It is felt this possibly may have been related to the  

ciprofloxacin.  Patient is admitted for treatment of her acute renal failure 

with IV fluids.








Hospital Course


Hospital Course: 


21-year-old female who presented with acute renal failure secondary to 

dehydration and possibly ciprofloxacin.  Patient was given IV fluids and her 

creatinine improved.  On the day of discharge her creatinine was down to 1.29.  

The patient was evaluated by nephrology who recommended a renal vascular 

ultrasound showed no evidence for renal artery stenosis.  The patient also had 

complaints of chest pain and had a VQ scan that was normal.  The patient most 

likely has asthma as the cause for her chest tightness.  She does have a 

history of asthma.  Patient also complained of abdominal pain and was given 

Reglan with complete resolution of her abdominal pain.  The patient was 

tolerating a diet well.  It was felt that she was stable for discharge to home.

  She will follow-up with her nephrologist in 1 week.





Physical Exam


Vital Signs: 


 











Temp Pulse Resp BP Pulse Ox


 


 98.9 F   85   15   130/82 H  98 


 


 09/15/17 09:09  09/15/17 09:09  09/15/17 09:09  09/15/17 09:09  09/15/17 09:09








 Intake & Output











 09/14/17 09/15/17 09/16/17





 06:59 06:59 06:59


 


Intake Total 7773 7074 


 


Output Total 1900 4100 


 


Balance 5873 2974 











General appearance: PRESENT: no acute distress


Eye exam: PRESENT: conjunctiva pink.  ABSENT: scleral icterus


Mouth exam: PRESENT: moist, tongue midline


Neck exam: ABSENT: JVD


Respiratory exam: PRESENT: clear to auscultation hadley.  ABSENT: rales, rhonchi, 

wheezes


Cardiovascular exam: PRESENT: RRR.  ABSENT: diastolic murmur, rubs, systolic 

murmur


GI/Abdominal exam: PRESENT: normal bowel sounds, soft.  ABSENT: distended, 

guarding, mass, organolmegaly, rebound, tenderness


Extremities exam: ABSENT: calf tenderness, clubbing, pedal edema


Neurological exam: PRESENT: alert, awake, oriented to person, oriented to place

, oriented to time, oriented to situation, CN II-XII grossly intact.  ABSENT: 

motor sensory deficit


Psychiatric exam: PRESENT: appropriate affect


Skin exam: PRESENT: dry, intact, warm.  ABSENT: cyanosis, rash





Results


Laboratory Results: 


 





 09/15/17 06:11 





 09/15/17 06:11 





 











  09/15/17 09/15/17





  06:11 06:11


 


WBC  8.4 


 


RBC  3.85 


 


Hgb  11.4 L 


 


Hct  33.4 L 


 


MCV  87 


 


MCH  29.6 


 


MCHC  34.1 


 


RDW  14.4 H 


 


Plt Count  193 


 


Seg Neutrophils %  66.9 


 


Lymphocytes %  21.8 


 


Monocytes %  9.7 


 


Eosinophils %  1.0 


 


Basophils %  0.6 


 


Absolute Neutrophils  5.6 


 


Absolute Lymphocytes  1.8 


 


Absolute Monocytes  0.8 


 


Absolute Eosinophils  0.1 


 


Absolute Basophils  0.1 


 


Sodium   140.6


 


Potassium   3.8


 


Chloride   104


 


Carbon Dioxide   25


 


Anion Gap   12


 


BUN   12


 


Creatinine   1.29 H


 


Est GFR ( Amer)   > 60


 


Est GFR (Non-Af Amer)   52 L


 


Glucose   91


 


Calcium   9.1








 











  09/13/17





  06:35


 


NT-Pro-B Natriuret Pep  827 H











Impressions: 


 





Limited or Localized CT  09/11/17 22:22


IMPRESSION:  NO SIGNIFICANT OR ACUTE PROCESS IN THE ABDOMEN OR PELVIS.


 








Renal Ultrasound  09/12/17 00:00


IMPRESSION:  NORMAL RENAL AND BLADDER ULTRASOUND.


 








Chest X-Ray  09/13/17 00:00


IMPRESSION:  NO SIGNIFICANT RADIOGRAPHIC FINDING IN THE CHEST.


 








Lung Scan-VQ NM  09/14/17 00:00


IMPRESSION:  NORMAL VENTILATION-PERFUSION LUNG SCAN.  NEGATIVE FOR PULMONARY 

EMBOLI.


 








Vascular Ultrasound  09/14/17 01:00


IMPRESSION:  NO DOPPLER EVIDENCE OF HEMODYNAMICALLY SIGNIFICANT RENAL ARTERY 

STENOSIS.


 














Qualifiers


**PATEINT BEING DISCHARGED WITH ANY OF THE FOLLOWING DIAGNOSIS?: No





Plan


Discharge Plan: 


Patient is discharged home.  Will follow up with nephrology in 1 week.


Time Spent: Less than 30 Minutes

## 2017-09-21 ENCOUNTER — HOSPITAL ENCOUNTER (OUTPATIENT)
Dept: HOSPITAL 62 - OD | Age: 22
End: 2017-09-21
Attending: INTERNAL MEDICINE
Payer: COMMERCIAL

## 2017-09-21 DIAGNOSIS — R10.9: ICD-10-CM

## 2017-09-21 DIAGNOSIS — R19.7: ICD-10-CM

## 2017-09-21 DIAGNOSIS — N17.9: Primary | ICD-10-CM

## 2017-09-21 LAB
ALBUMIN SERPL-MCNC: 4.7 G/DL (ref 3.5–5)
ALP SERPL-CCNC: 98 U/L (ref 38–126)
ALT SERPL-CCNC: 39 U/L (ref 9–52)
ANION GAP SERPL CALC-SCNC: 15 MMOL/L (ref 5–19)
APPEARANCE UR: (no result)
AST SERPL-CCNC: 31 U/L (ref 14–36)
BASOPHILS # BLD AUTO: 0.1 10^3/UL (ref 0–0.2)
BASOPHILS NFR BLD AUTO: 0.8 % (ref 0–2)
BILIRUB DIRECT SERPL-MCNC: 0.4 MG/DL (ref 0–0.4)
BILIRUB SERPL-MCNC: 0.6 MG/DL (ref 0.2–1.3)
BILIRUB UR QL STRIP: NEGATIVE
BUN SERPL-MCNC: 14 MG/DL (ref 7–20)
CALCIUM: 10.2 MG/DL (ref 8.4–10.2)
CHLORIDE SERPL-SCNC: 102 MMOL/L (ref 98–107)
CK SERPL-CCNC: 95 U/L (ref 30–135)
CO2 SERPL-SCNC: 26 MMOL/L (ref 22–30)
CREAT SERPL-MCNC: 0.95 MG/DL (ref 0.52–1.25)
EOSINOPHIL # BLD AUTO: 0.5 10^3/UL (ref 0–0.6)
EOSINOPHIL NFR BLD AUTO: 5 % (ref 0–6)
ERYTHROCYTE [DISTWIDTH] IN BLOOD BY AUTOMATED COUNT: 14.3 % (ref 11.5–14)
GLUCOSE SERPL-MCNC: 78 MG/DL (ref 75–110)
GLUCOSE UR STRIP-MCNC: NEGATIVE MG/DL
HCT VFR BLD CALC: 38.5 % (ref 36–47)
HGB BLD-MCNC: 13.2 G/DL (ref 12–15.5)
HGB HCT DIFFERENCE: 1.1
KETONES UR STRIP-MCNC: NEGATIVE MG/DL
LDH1 SERPL-CCNC: 659 U/L (ref 313–618)
LYMPHOCYTES # BLD AUTO: 2.3 10^3/UL (ref 0.5–4.7)
LYMPHOCYTES NFR BLD AUTO: 25.3 % (ref 13–45)
MCH RBC QN AUTO: 29.5 PG (ref 27–33.4)
MCHC RBC AUTO-ENTMCNC: 34.3 G/DL (ref 32–36)
MCV RBC AUTO: 86 FL (ref 80–97)
MONOCYTES # BLD AUTO: 0.8 10^3/UL (ref 0.1–1.4)
MONOCYTES NFR BLD AUTO: 8.6 % (ref 3–13)
NEUTROPHILS # BLD AUTO: 5.5 10^3/UL (ref 1.7–8.2)
NEUTS SEG NFR BLD AUTO: 60.3 % (ref 42–78)
NITRITE UR QL STRIP: NEGATIVE
PH UR STRIP: 6 [PH] (ref 5–9)
POTASSIUM SERPL-SCNC: 4.8 MMOL/L (ref 3.6–5)
PROT SERPL-MCNC: 8.2 G/DL (ref 6.3–8.2)
PROT UR STRIP-MCNC: NEGATIVE MG/DL
RBC # BLD AUTO: 4.48 10^6/UL (ref 3.72–5.28)
SODIUM SERPL-SCNC: 142.5 MMOL/L (ref 137–145)
SP GR UR STRIP: 1.01
UROBILINOGEN UR-MCNC: NEGATIVE MG/DL (ref ?–2)
WBC # BLD AUTO: 9.1 10^3/UL (ref 4–10.5)

## 2017-09-21 PROCEDURE — 80053 COMPREHEN METABOLIC PANEL: CPT

## 2017-09-21 PROCEDURE — 86060 ANTISTREPTOLYSIN O TITER: CPT

## 2017-09-21 PROCEDURE — 82550 ASSAY OF CK (CPK): CPT

## 2017-09-21 PROCEDURE — 86160 COMPLEMENT ANTIGEN: CPT

## 2017-09-21 PROCEDURE — 86162 COMPLEMENT TOTAL (CH50): CPT

## 2017-09-21 PROCEDURE — 81001 URINALYSIS AUTO W/SCOPE: CPT

## 2017-09-21 PROCEDURE — 36415 COLL VENOUS BLD VENIPUNCTURE: CPT

## 2017-09-21 PROCEDURE — 83615 LACTATE (LD) (LDH) ENZYME: CPT

## 2017-09-21 PROCEDURE — 85025 COMPLETE CBC W/AUTO DIFF WBC: CPT

## 2017-09-22 LAB
C3 SERPL-MCNC: 202 MG/DL (ref 82–167)
C4 SERPL-MCNC: 51 MG/DL (ref 14–44)

## 2017-09-28 LAB — CH50 SERPL-ACNC: 18 U/ML (ref 42–60)

## 2017-09-29 ENCOUNTER — HOSPITAL ENCOUNTER (OUTPATIENT)
Dept: HOSPITAL 62 - OD | Age: 22
End: 2017-09-29
Attending: INTERNAL MEDICINE
Payer: COMMERCIAL

## 2017-09-29 DIAGNOSIS — R10.9: ICD-10-CM

## 2017-09-29 DIAGNOSIS — R19.7: ICD-10-CM

## 2017-09-29 DIAGNOSIS — N18.2: Primary | ICD-10-CM

## 2017-09-29 LAB
ALBUMIN SERPL-MCNC: 4.7 G/DL (ref 3.5–5)
ALP SERPL-CCNC: 97 U/L (ref 38–126)
ALT SERPL-CCNC: 27 U/L (ref 9–52)
ANION GAP SERPL CALC-SCNC: 14 MMOL/L (ref 5–19)
APPEARANCE UR: CLEAR
AST SERPL-CCNC: 25 U/L (ref 14–36)
BASOPHILS # BLD AUTO: 0.1 10^3/UL (ref 0–0.2)
BASOPHILS NFR BLD AUTO: 0.8 % (ref 0–2)
BILIRUB DIRECT SERPL-MCNC: 0.4 MG/DL (ref 0–0.4)
BILIRUB SERPL-MCNC: 0.8 MG/DL (ref 0.2–1.3)
BILIRUB UR QL STRIP: NEGATIVE
BUN SERPL-MCNC: 17 MG/DL (ref 7–20)
CALCIUM: 10.3 MG/DL (ref 8.4–10.2)
CHLORIDE SERPL-SCNC: 103 MMOL/L (ref 98–107)
CO2 SERPL-SCNC: 24 MMOL/L (ref 22–30)
CREAT SERPL-MCNC: 0.9 MG/DL (ref 0.52–1.25)
EOSINOPHIL # BLD AUTO: 0.3 10^3/UL (ref 0–0.6)
EOSINOPHIL NFR BLD AUTO: 3.5 % (ref 0–6)
ERYTHROCYTE [DISTWIDTH] IN BLOOD BY AUTOMATED COUNT: 13.8 % (ref 11.5–14)
GLUCOSE SERPL-MCNC: 90 MG/DL (ref 75–110)
GLUCOSE UR STRIP-MCNC: NEGATIVE MG/DL
HCT VFR BLD CALC: 37.6 % (ref 36–47)
HGB BLD-MCNC: 12.7 G/DL (ref 12–15.5)
HGB HCT DIFFERENCE: 0.5
KETONES UR STRIP-MCNC: NEGATIVE MG/DL
LYMPHOCYTES # BLD AUTO: 2.3 10^3/UL (ref 0.5–4.7)
LYMPHOCYTES NFR BLD AUTO: 28.4 % (ref 13–45)
MCH RBC QN AUTO: 29.4 PG (ref 27–33.4)
MCHC RBC AUTO-ENTMCNC: 33.8 G/DL (ref 32–36)
MCV RBC AUTO: 87 FL (ref 80–97)
MONOCYTES # BLD AUTO: 0.7 10^3/UL (ref 0.1–1.4)
MONOCYTES NFR BLD AUTO: 8.8 % (ref 3–13)
NEUTROPHILS # BLD AUTO: 4.7 10^3/UL (ref 1.7–8.2)
NEUTS SEG NFR BLD AUTO: 58.5 % (ref 42–78)
NITRITE UR QL STRIP: NEGATIVE
PH UR STRIP: 5 [PH] (ref 5–9)
POTASSIUM SERPL-SCNC: 4.9 MMOL/L (ref 3.6–5)
PROT SERPL-MCNC: 8.2 G/DL (ref 6.3–8.2)
PROT UR STRIP-MCNC: NEGATIVE MG/DL
RBC # BLD AUTO: 4.33 10^6/UL (ref 3.72–5.28)
SODIUM SERPL-SCNC: 140.5 MMOL/L (ref 137–145)
SP GR UR STRIP: 1.01
UROBILINOGEN UR-MCNC: NEGATIVE MG/DL (ref ?–2)
WBC # BLD AUTO: 8 10^3/UL (ref 4–10.5)

## 2017-09-29 PROCEDURE — 80053 COMPREHEN METABOLIC PANEL: CPT

## 2017-09-29 PROCEDURE — 36415 COLL VENOUS BLD VENIPUNCTURE: CPT

## 2017-09-29 PROCEDURE — 81001 URINALYSIS AUTO W/SCOPE: CPT

## 2017-09-29 PROCEDURE — 85025 COMPLETE CBC W/AUTO DIFF WBC: CPT

## 2018-11-14 ENCOUNTER — HOSPITAL ENCOUNTER (EMERGENCY)
Dept: HOSPITAL 62 - ER | Age: 23
Discharge: HOME | End: 2018-11-14
Payer: SELF-PAY

## 2018-11-14 VITALS — DIASTOLIC BLOOD PRESSURE: 72 MMHG | SYSTOLIC BLOOD PRESSURE: 112 MMHG

## 2018-11-14 DIAGNOSIS — Z91.041: ICD-10-CM

## 2018-11-14 DIAGNOSIS — Z87.892: ICD-10-CM

## 2018-11-14 DIAGNOSIS — J45.909: ICD-10-CM

## 2018-11-14 DIAGNOSIS — N94.6: Primary | ICD-10-CM

## 2018-11-14 DIAGNOSIS — F17.200: ICD-10-CM

## 2018-11-14 DIAGNOSIS — Z88.1: ICD-10-CM

## 2018-11-14 DIAGNOSIS — R11.2: ICD-10-CM

## 2018-11-14 DIAGNOSIS — Z91.018: ICD-10-CM

## 2018-11-14 DIAGNOSIS — Z91.010: ICD-10-CM

## 2018-11-14 DIAGNOSIS — Z91.013: ICD-10-CM

## 2018-11-14 DIAGNOSIS — K21.9: ICD-10-CM

## 2018-11-14 DIAGNOSIS — Z88.0: ICD-10-CM

## 2018-11-14 DIAGNOSIS — Z87.19: ICD-10-CM

## 2018-11-14 LAB
ADD MANUAL DIFF: NO
ALBUMIN SERPL-MCNC: 4.6 G/DL (ref 3.5–5)
ALP SERPL-CCNC: 88 U/L (ref 38–126)
ALT SERPL-CCNC: 15 U/L (ref 9–52)
ANION GAP SERPL CALC-SCNC: 11 MMOL/L (ref 5–19)
APPEARANCE UR: CLEAR
APTT PPP: (no result) S
AST SERPL-CCNC: 24 U/L (ref 14–36)
BASOPHILS # BLD AUTO: 0.1 10^3/UL (ref 0–0.2)
BASOPHILS NFR BLD AUTO: 0.7 % (ref 0–2)
BILIRUB DIRECT SERPL-MCNC: 0.3 MG/DL (ref 0–0.4)
BILIRUB SERPL-MCNC: 0.8 MG/DL (ref 0.2–1.3)
BILIRUB UR QL STRIP: NEGATIVE
BUN SERPL-MCNC: 9 MG/DL (ref 7–20)
CALCIUM: 10.1 MG/DL (ref 8.4–10.2)
CHLORIDE SERPL-SCNC: 101 MMOL/L (ref 98–107)
CO2 SERPL-SCNC: 30 MMOL/L (ref 22–30)
EOSINOPHIL # BLD AUTO: 0.2 10^3/UL (ref 0–0.6)
EOSINOPHIL NFR BLD AUTO: 2.7 % (ref 0–6)
ERYTHROCYTE [DISTWIDTH] IN BLOOD BY AUTOMATED COUNT: 13.8 % (ref 11.5–14)
GLUCOSE SERPL-MCNC: 83 MG/DL (ref 75–110)
GLUCOSE UR STRIP-MCNC: NEGATIVE MG/DL
HCT VFR BLD CALC: 38.7 % (ref 36–47)
HGB BLD-MCNC: 13.2 G/DL (ref 12–15.5)
KETONES UR STRIP-MCNC: NEGATIVE MG/DL
LYMPHOCYTES # BLD AUTO: 2.3 10^3/UL (ref 0.5–4.7)
LYMPHOCYTES NFR BLD AUTO: 27.1 % (ref 13–45)
MCH RBC QN AUTO: 29.9 PG (ref 27–33.4)
MCHC RBC AUTO-ENTMCNC: 34 G/DL (ref 32–36)
MCV RBC AUTO: 88 FL (ref 80–97)
MONOCYTES # BLD AUTO: 0.9 10^3/UL (ref 0.1–1.4)
MONOCYTES NFR BLD AUTO: 10 % (ref 3–13)
NEUTROPHILS # BLD AUTO: 5.1 10^3/UL (ref 1.7–8.2)
NEUTS SEG NFR BLD AUTO: 59.5 % (ref 42–78)
NITRITE UR QL STRIP: NEGATIVE
PH UR STRIP: 7 [PH] (ref 5–9)
PLATELET # BLD: 254 10^3/UL (ref 150–450)
POTASSIUM SERPL-SCNC: 4.1 MMOL/L (ref 3.6–5)
PROT SERPL-MCNC: 8 G/DL (ref 6.3–8.2)
PROT UR STRIP-MCNC: NEGATIVE MG/DL
RBC # BLD AUTO: 4.4 10^6/UL (ref 3.72–5.28)
SODIUM SERPL-SCNC: 141.6 MMOL/L (ref 137–145)
SP GR UR STRIP: 1
TOTAL CELLS COUNTED % (AUTO): 100 %
UROBILINOGEN UR-MCNC: NEGATIVE MG/DL (ref ?–2)
WBC # BLD AUTO: 8.6 10^3/UL (ref 4–10.5)

## 2018-11-14 PROCEDURE — 99284 EMERGENCY DEPT VISIT MOD MDM: CPT

## 2018-11-14 PROCEDURE — 80053 COMPREHEN METABOLIC PANEL: CPT

## 2018-11-14 PROCEDURE — S0119 ONDANSETRON 4 MG: HCPCS

## 2018-11-14 PROCEDURE — 81025 URINE PREGNANCY TEST: CPT

## 2018-11-14 PROCEDURE — 85025 COMPLETE CBC W/AUTO DIFF WBC: CPT

## 2018-11-14 PROCEDURE — 81001 URINALYSIS AUTO W/SCOPE: CPT

## 2018-11-14 PROCEDURE — 36415 COLL VENOUS BLD VENIPUNCTURE: CPT

## 2018-11-14 NOTE — ER DOCUMENT REPORT
ED General





- General


Chief Complaint: Nausea/Vomiting


Stated Complaint: NAUSEA/VOMITING


Time Seen by Provider: 11/14/18 14:01


TRAVEL OUTSIDE OF THE [place] IN LAST 30 DAYS: No





- HPI


Notes: 





Patient is a 23-year-old female with a h/o GERD who presents to the ED with 

primary concern for possible pregnancy.  Patient states that she started having 

heavy flow for her menstrual period yesterday which is 4 days early for her.  

Patient states that she has had a couple episodes of vomiting right after she 

tried to eat something, but has noted some flareups of her acid reflux which 

she believes is inducing that.  Patient states that she otherwise feels well.  

Patient states that her menstrual flow has improved and is currently "light."  

She is otherwise urinating normally and having normal bowel movements.  She has 

not have any concern of pain at this time aside from intermittent cramping 

which is normal for her during menstrual cycles.  She has no concern of STD or 

STI.  Denies any headache, fever, URI, sore throat, chest pain, palpitations, 

syncope, cough, shortness of breath, wheeze, dyspnea, abdominal pain, nausea/

diarrhea, urinary retention, dysuria, hematuria, back pain, loss of control of 

bowel or bladder, numbness/tingling, or rash.





- Related Data


Allergies/Adverse Reactions: 


 





Iodinated Contrast- Oral and IV Dye Allergy (Severe, Verified 11/14/18 14:00)


 Anaphylaxis


peanut Allergy (Severe, Verified 11/14/18 14:00)


 Anaphylaxis


Penicillins Allergy (Severe, Verified 11/14/18 14:00)


 Anaphylaxis


shellfish derived Allergy (Severe, Verified 11/14/18 14:00)


 Anaphylaxis


strawberry Allergy (Severe, Verified 11/14/18 14:00)


 Anaphylaxis


amoxicillin Allergy (Intermediate, Verified 11/14/18 14:00)


 Vomiting


cefaclor [From Ceclor] Allergy (Verified 11/14/18 14:00)


 


clindamycin Allergy (Verified 11/14/18 14:00)


 


erythromycin base Allergy (Verified 11/14/18 14:00)


 


ketchup Allergy (Severe, Uncoded 11/14/18 14:00)


 Anaphylaxis











Past Medical History





- Social History


Smoking Status: Current Every Day Smoker


Chew tobacco use (# tins/day): No


Frequency of alcohol use: None


Drug Abuse: None


Family History: Reviewed & Not Pertinent


Patient has suicidal ideation: No


Patient has homicidal ideation: No





- Past Medical History


Cardiac Medical History: 


   Denies: Hx Atrial Fibrillation, Hx Congestive Heart Failure, Hx Coronary 

Artery Disease, Hx DVT, Hx Heart Attack, Hx Hypercholesterolemia, Hx 

Hypertension, Hx Pulmonary Embolism


Pulmonary Medical History: Reports: Hx Asthma - Has not bothered her for years


   Denies: Hx COPD, Hx Sleep Apnea


Neurological Medical History: Reports: Hx Migraine - Occasional.  Denies: Hx 

Seizures


Endocrine Medical History: Denies: Hx Diabetes Mellitus Type 1, Hx Diabetes 

Mellitus Type 2, Hx Hyperthyroidism, Hx Hypothyroidism


Renal/ Medical History: Denies: Hx Peritoneal Dialysis


GI Medical History: Reports: Hx Gastroesophageal Reflux Disease.  Denies: Hx 

Cirrhosis, Hx Hepatitis


Skin Medical History: Reports Hx Eczema


Psychiatric Medical History: Reports: Hx Depression


Infectious Medical History: Denies: Hx Hepatitis


Past Surgical History: Reports: Hx Nose Surgery, Hx Tonsillectomy, Other - 

Nasal surgery





- Immunizations


Hx Diphtheria, Pertussis, Tetanus Vaccination: Yes





Review of Systems





- Review of Systems


-: Yes All other systems reviewed and negative





Physical Exam





- Vital signs


Vitals: 


 











Temp Pulse Resp BP Pulse Ox


 


 98.6 F   95   15   115/72   100 


 


 11/14/18 13:20  11/14/18 13:20  11/14/18 13:20  11/14/18 13:20  11/14/18 13:20














- Notes


Notes: 





PHYSICAL EXAMINATION:





GENERAL: Well-appearing, well-nourished and in no acute distress.  A&ox4.  

answers questions appropriately.





LUNGS: Breath sounds clear to auscultation bilaterally and equal.  No wheezes 

rales or rhonchi.





HEART: Regular rate and rhythm without murmurs, rubs, gallops.





ABDOMEN: Soft, nontender, nondistended abdomen.  No guarding, no rebound.  No 

masses appreciated.  Normal bowel sounds present.  No CVA tenderness 

bilaterally.





: deferred.





Musculoskeletal: FROM to passive/active. Strength 5+/5. 





Extremities:  No cyanosis, clubbing, or edema b/l.  Peripheral pulses 2+.  

Capillary refill less than 3 seconds.





NEUROLOGICAL: Normal speech, normal gait.  





PSYCH: Normal mood, normal affect.





SKIN: Warm, Dry, normal turgor, no rashes or lesions noted.





Course





- Re-evaluation


Re-evalutation: 





11/14/18 15:10


Patient is an afebrile, well-hydrated, 23-year-old female who presents to the 

ED with dysmenorrhea.  Vitals are acceptable without any significant tachycardia

, tachypnea, or hypoxia.  PE is otherwise unremarkable.  CBC, CMP, urinalysis, 

and hCG are unremarkable for any acute pathology.  Patient declined any STD or 

STI testing.  Patient is nontoxic-appearing is tolerating p.o. without any 

difficulties.  No other labs or imaging warranted at this time based on H&P.  

Low suspicion/risk for severe anemia, acute appendicitis, bowel obstruction, 

acute cholecystitis, acute cholangitis, perforated diverticulitis, incarcerated 

hernia, pancreatitis, perforated ulcer, peritonitis, sepsis, pelvic 

inflammatory disease, ectopic pregnancy, tubo-ovarian abscess, ovarian torsion, 

or other systemic emergent condition at this time.  Patient is aware that her 

condition can change from initial presentation and she needs to monitor 

symptoms closely and seek medical attention if any acute changes.  I will send 

her home with prescription for Zofran.  Conservative measures otherwise for 

symptoms.  Recheck with your PCM/OBGYN in 3-5 days.  Return to the ED with any 

worsening/concerning symptoms otherwise as reviewed in discharge.  Patient is 

in agreement.





- Vital Signs


Vital signs: 


 











Temp Pulse Resp BP Pulse Ox


 


 98.6 F   95   15   115/72   100 


 


 11/14/18 13:20  11/14/18 13:20  11/14/18 13:20  11/14/18 13:20  11/14/18 13:20














- Laboratory


Result Diagrams: 


 11/14/18 14:15





 11/14/18 14:15


Laboratory results interpreted by me: 


 











  11/14/18





  14:15


 


Urine Blood  LARGE H














Discharge





- Discharge


Clinical Impression: 


 Dysmenorrhea





Condition: Stable


Disposition: HOME, SELF-CARE


Instructions:  Dysmenorrhea ([facility])


Additional Instructions: 


Maintain fluid intake


Proper hygienic technique


Keep the skin clean


Safe sexual practices with condoms everytime


Tylenol/ibuprofen as needed


F/u with your PCM/OBGYN in 3-5 days for a recheck


Return to the ED with any development of HA/fever, trouble with vision, eye 

redness, worsening pain, vaginal discharge, urinary retention, blood in the 

urine, flank pain, abdominal pain, n/v, Chest Pain, shortness of breath, joint 

pains, trouble breathing, or any other worsening/concerning symptoms as needed 

otherwise.


Prescriptions: 


Ondansetron [Zofran Odt 4 mg Tablet] 1 - 2 tab PO Q4H PRN #15 tab.rapdis


 PRN Reason: For Nausea/Vomiting


Forms:  Smoking Cessation Education, Return to Work


Referrals: 


[facility] [Provider Group] - Follow up as needed


MIRELLA LARA MD [ACTIVE STAFF] - Follow up as needed

## 2019-01-24 ENCOUNTER — HOSPITAL ENCOUNTER (EMERGENCY)
Dept: HOSPITAL 62 - ER | Age: 24
LOS: 1 days | Discharge: HOME | End: 2019-01-25
Payer: COMMERCIAL

## 2019-01-24 DIAGNOSIS — Z91.013: ICD-10-CM

## 2019-01-24 DIAGNOSIS — Z91.010: ICD-10-CM

## 2019-01-24 DIAGNOSIS — R31.9: ICD-10-CM

## 2019-01-24 DIAGNOSIS — Z91.018: ICD-10-CM

## 2019-01-24 DIAGNOSIS — R45.851: Primary | ICD-10-CM

## 2019-01-24 DIAGNOSIS — F17.200: ICD-10-CM

## 2019-01-24 DIAGNOSIS — Z88.3: ICD-10-CM

## 2019-01-24 DIAGNOSIS — Z88.0: ICD-10-CM

## 2019-01-24 LAB
ADD MANUAL DIFF: NO
ALBUMIN SERPL-MCNC: 4.7 G/DL (ref 3.5–5)
ALP SERPL-CCNC: 89 U/L (ref 38–126)
ALT SERPL-CCNC: 17 U/L (ref 9–52)
ANION GAP SERPL CALC-SCNC: 10 MMOL/L (ref 5–19)
APAP SERPL-MCNC: < 10 UG/ML (ref 10–30)
APPEARANCE UR: CLEAR
APTT PPP: (no result) S
AST SERPL-CCNC: 22 U/L (ref 14–36)
BARBITURATES UR QL SCN: NEGATIVE
BASOPHILS # BLD AUTO: 0 10^3/UL (ref 0–0.2)
BASOPHILS NFR BLD AUTO: 0.5 % (ref 0–2)
BILIRUB DIRECT SERPL-MCNC: 0.3 MG/DL (ref 0–0.4)
BILIRUB SERPL-MCNC: 0.5 MG/DL (ref 0.2–1.3)
BILIRUB UR QL STRIP: NEGATIVE
BUN SERPL-MCNC: 10 MG/DL (ref 7–20)
CALCIUM: 9.5 MG/DL (ref 8.4–10.2)
CHLORIDE SERPL-SCNC: 103 MMOL/L (ref 98–107)
CO2 SERPL-SCNC: 28 MMOL/L (ref 22–30)
EOSINOPHIL # BLD AUTO: 0.2 10^3/UL (ref 0–0.6)
EOSINOPHIL NFR BLD AUTO: 2.1 % (ref 0–6)
ERYTHROCYTE [DISTWIDTH] IN BLOOD BY AUTOMATED COUNT: 13.3 % (ref 11.5–14)
ETHANOL SERPL-MCNC: < 10 MG/DL
GLUCOSE SERPL-MCNC: 90 MG/DL (ref 75–110)
GLUCOSE UR STRIP-MCNC: NEGATIVE MG/DL
HCT VFR BLD CALC: 41.2 % (ref 36–47)
HGB BLD-MCNC: 13.9 G/DL (ref 12–15.5)
KETONES UR STRIP-MCNC: NEGATIVE MG/DL
LYMPHOCYTES # BLD AUTO: 2.5 10^3/UL (ref 0.5–4.7)
LYMPHOCYTES NFR BLD AUTO: 34.3 % (ref 13–45)
MCH RBC QN AUTO: 29.8 PG (ref 27–33.4)
MCHC RBC AUTO-ENTMCNC: 33.8 G/DL (ref 32–36)
MCV RBC AUTO: 88 FL (ref 80–97)
METHADONE UR QL SCN: NEGATIVE
MONOCYTES # BLD AUTO: 0.7 10^3/UL (ref 0.1–1.4)
MONOCYTES NFR BLD AUTO: 9.5 % (ref 3–13)
NEUTROPHILS # BLD AUTO: 3.9 10^3/UL (ref 1.7–8.2)
NEUTS SEG NFR BLD AUTO: 53.6 % (ref 42–78)
NITRITE UR QL STRIP: NEGATIVE
PCP UR QL SCN: NEGATIVE
PH UR STRIP: 8 [PH] (ref 5–9)
PLATELET # BLD: 286 10^3/UL (ref 150–450)
POTASSIUM SERPL-SCNC: 4.1 MMOL/L (ref 3.6–5)
PROT SERPL-MCNC: 7.9 G/DL (ref 6.3–8.2)
PROT UR STRIP-MCNC: NEGATIVE MG/DL
RBC # BLD AUTO: 4.68 10^6/UL (ref 3.72–5.28)
SALICYLATES SERPL-MCNC: 4.8 MG/DL (ref 2–20)
SODIUM SERPL-SCNC: 140.8 MMOL/L (ref 137–145)
SP GR UR STRIP: 1.01
TOTAL CELLS COUNTED % (AUTO): 100 %
URINE AMPHETAMINES SCREEN: NEGATIVE
URINE BENZODIAZEPINES SCREEN: NEGATIVE
URINE COCAINE SCREEN: NEGATIVE
URINE MARIJUANA (THC) SCREEN: NEGATIVE
UROBILINOGEN UR-MCNC: NEGATIVE MG/DL (ref ?–2)
WBC # BLD AUTO: 7.3 10^3/UL (ref 4–10.5)

## 2019-01-24 PROCEDURE — 93005 ELECTROCARDIOGRAM TRACING: CPT

## 2019-01-24 PROCEDURE — 81001 URINALYSIS AUTO W/SCOPE: CPT

## 2019-01-24 PROCEDURE — 99285 EMERGENCY DEPT VISIT HI MDM: CPT

## 2019-01-24 PROCEDURE — 80307 DRUG TEST PRSMV CHEM ANLYZR: CPT

## 2019-01-24 PROCEDURE — 36415 COLL VENOUS BLD VENIPUNCTURE: CPT

## 2019-01-24 PROCEDURE — 85025 COMPLETE CBC W/AUTO DIFF WBC: CPT

## 2019-01-24 PROCEDURE — 80053 COMPREHEN METABOLIC PANEL: CPT

## 2019-01-24 PROCEDURE — 93010 ELECTROCARDIOGRAM REPORT: CPT

## 2019-01-24 RX ADMIN — OLANZAPINE SCH MG: 5 TABLET, FILM COATED ORAL at 19:52

## 2019-01-24 RX ADMIN — BENZTROPINE MESYLATE SCH MG: 1 TABLET ORAL at 19:52

## 2019-01-24 NOTE — ER DOCUMENT REPORT
ED Medical Screen (RME)





- General


Chief Complaint: Suicidal Ideation


Stated Complaint: PSYCH


Time Seen by Provider: 01/24/19 15:52


Mode of Arrival: Ambulatory


Information source: Patient


Notes: 





This is a 23-year-old female who is accompanied by her older sister.  The 

patient does have a history of bipolar affective disorder (currently on no 

medicines for the last 2 months), history of alcohol and drug abuse (sober for 

the past year).  Patient had reportedly been doing well on her medical regimen 

when she lost her job and insurance.  The patient was placed on Medicaid which 

would not cover all of her medicines.  She was previously seen by Dr. Qureshi 

and after the insurance change, had converted to [facility] which try to adjust her 

medicines so that they would be covered by Medicaid.  Patient's sister states 

that the patient decompensated since that time and has just come off all 

medicines for the past 2 months because of the side effects that she was having.





Patient's sister states that the patient has been expressing suicidal ideations 

as well as ideations to both return to drugs and alcohol.  The older sister 

called the  to have the patient committed and the  recommended the

sister to take the patient to the ER.


TRAVEL OUTSIDE OF THE [place] IN LAST 30 DAYS: No





- Related Data


Allergies/Adverse Reactions: 


                                        





Iodinated Contrast- Oral and IV Dye Allergy (Severe, Verified 11/14/18 14:00)


   Anaphylaxis


peanut Allergy (Severe, Verified 11/14/18 14:00)


   Anaphylaxis


Penicillins Allergy (Severe, Verified 11/14/18 14:00)


   Anaphylaxis


shellfish derived Allergy (Severe, Verified 11/14/18 14:00)


   Anaphylaxis


strawberry Allergy (Severe, Verified 11/14/18 14:00)


   Anaphylaxis


amoxicillin Allergy (Intermediate, Verified 11/14/18 14:00)


   Vomiting


cefaclor [From Ceclor] Allergy (Verified 11/14/18 14:00)


   


clindamycin Allergy (Verified 11/14/18 14:00)


   


erythromycin base Allergy (Verified 11/14/18 14:00)


   


ketchup Allergy (Severe, Uncoded 11/14/18 14:00)


   Anaphylaxis











Past Medical History





- Past Medical History


Cardiac Medical History: 


   Denies: Hx Atrial Fibrillation, Hx Congestive Heart Failure, Hx Coronary 

Artery Disease, Hx DVT, Hx Heart Attack, Hx Hypercholesterolemia, Hx 

Hypertension, Hx Pulmonary Embolism


Pulmonary Medical History: Reports: Hx Asthma - Has not bothered her for years


   Denies: Hx COPD, Hx Sleep Apnea


Neurological Medical History: Reports: Hx Migraine - Occasional.  Denies: Hx 

Seizures


Endocrine Medical History: Denies: Hx Diabetes Mellitus Type 1, Hx Diabetes 

Mellitus Type 2, Hx Hyperthyroidism, Hx Hypothyroidism


Renal/ Medical History: Denies: Hx Peritoneal Dialysis


GI Medical History: Reports: Hx Gastroesophageal Reflux Disease.  Denies: Hx 

Cirrhosis, Hx Hepatitis


Skin Medical History: Reports Hx Eczema


Psychiatric Medical History: Reports: Hx Depression


Infectious Medical History: Denies: Hx Hepatitis


Past Surgical History: Reports: Hx Nose Surgery, Hx Tonsillectomy, Other - Nasal

surgery





- Immunizations


Hx Diphtheria, Pertussis, Tetanus Vaccination: Yes





Physical Exam





- Vital signs


Vitals: 





                                        











Temp Pulse Resp BP Pulse Ox


 


 98.5 F   86   16   121/69   97 


 


 01/24/19 15:21  01/24/19 15:21  01/24/19 15:21  01/24/19 15:21  01/24/19 15:21














Course





- Vital Signs


Vital signs: 





                                        











Temp Pulse Resp BP Pulse Ox


 


 98.5 F   86   16   121/69   97 


 


 01/24/19 15:21  01/24/19 15:21  01/24/19 15:21  01/24/19 15:21  01/24/19 15:21

## 2019-01-24 NOTE — PSYCHOLOGICAL NOTE
Psych Note





- Psych Note


Date seen by psych provider: 01/24/19


Time seen by psych provider: 16:00


Psych Note: 


Reason for Consult: auditory hallucinations, suicidal ideation


Consent permission: Patient's sister Ashley, [phone]





This is a 23-year-old female who is accompanied by her older sister.  The 

patient does have a history of bipolar and borderline personality disorder 

(currently on no medicines for the last 2 months), history of alcohol and drug 

abuse (sober for the past year with brief relapse 2 months ago).  Patient 

discloses that she came to [facility] ED because she is "manic bipolar and "want to use

but cannot."  She disclosed that she is fighting relapsing and is having 

difficulty with "hearing stuff."  She states that hearing things is "normal" 

however that is worsened lately.  When asked for clarification she reports that 

she hears background noise daily nonstop however it has been getting worse and 

it tends to be when she is trying to go to sleep or when she is sleeping she 

hears multiple voices in normal conversations.  When asked if she ever sees 

things she reports no however states that that is not entirely true because she 

believes in spirits and sees them.  She states that she is currently not having 

thoughts of wanting to harm herself however knows that it comes and goes very 

quickly and states that in the next hour or so she may impulsively want to harm 

herself.  She states the last time she used it was November 2018 she both drank 

and used pot but is unable to remember the exact date because she was so drunk. 

She has been struggling with her addiction and overall feels that sobriety other

than brief relapse in November has been of about 1 year.  She disclosed that she

did attempt to overdose in 2017 but denies receiving any medical attention 

stating that she "just threw everything up and slept for 2 days."  She reports 

that she feels overwhelming thoughts of being useless, not doing what she is 

supposed to be doing, and feeling stuck.  She reports that "I am just not 

happy."  She disclosed that she used to go to [facility] however when she lost her 

insurance she went to [facility].  She reports that there was so many 

medication changes.





Patient's sister Ashley discloses that the patient has been struggling with 

alcohol and opiate abuse and was sent in to rehab back in 2017.  She continued 

to report that the patient has a diagnosis of borderline personality disorder 

and bipolar has been off her medications for about 2 months.  She states that it

has been difficult because she lost insurance so her medications had to change 

and since then she does not been stabilized.  She discloses that the patient did

attempt overdose in 2017 approximately September or October timeframe.  She 

reports concerned because the patient disclosed to her that today she felt like 

things were crawling on her and she is hearing voices.





Patient is alert and orientated to person, place, time and circumstance.  Mood 

is slightly anxious with congruent affect.  Patient is observed sitting on the 

bed with her legs tucked against her chest and arms wrapped around her knees.  

Patient denies current suicidal ideation however reports passive chronic 

suicidal ideation that comes and goes.  Patient denies homicidal ideation.  D

elusions are absent behaviors congruent with an intact reality based 

presentation i.e. organized and linear thought process.  Patient does report 

some visual and auditory hallucinations however patient is not demonstrating any

behaviors or responding to internal stimuli.  Eye contact is well-maintained.  

Intellectual abilities appear to be within the average range.  Attention and 

concentration are fair.  Insight, judgment, impulse control are fair.





Medication recommendations per [facility]'s contracted psychiatrist Dr. Mita TALBOT 

are as follows


Zyprexa 5 mg twice daily 


Cogentin 1 mg daily





296.80 (F31.9) unspecified bipolar and related disorder per history provided by 

patient


301.83 (F360.3) borderline personality disorder per history of by the patient's 

family





Impression\plan: Patient is recommended for overnight mental health observation.

 Patient does not meet IVC criteria per [place] GS 122C however reports that she 

would like to get back on her medication. She is voluntarily staying to be 

observed to start medications with probable discharge in morning.   Patient does

present slightly anxious currently and endorses passive suicidal ideation that 

comes and goes.  Dr. Khanna was consulted and care management this patient; 

attending physicians in agreement with recommendations and disposition.

## 2019-01-24 NOTE — ER DOCUMENT REPORT
ED Psych Disorder / Suicide





<NEWSOMEMORE - Last Filed: 01/25/19 10:01>





- General


Mode of Arrival: Ambulatory


TRAVEL OUTSIDE OF THE [place] IN LAST 30 DAYS: No





<LUDY CAICEDO - Last Filed: 01/25/19 10:21>





- General


Chief Complaint: Suicidal Ideation


Stated Complaint: PSYCH


Time Seen by Provider: 01/24/19 15:52


Primary Care Provider: 


[facility] [Outside] - Follow up as needed


Notes: 





Patient brought in because she is expressing suicidal thoughts.  She has a 

history of bipolar disorder.  Depression.  Says that she is having some visual 

disturbances and auditory hallucinations.  This is been going on for a couple of

 days.  She is been out of her medications. (LUDY CAICEDO)





- Related Data


Allergies/Adverse Reactions: 


                                        





Iodinated Contrast- Oral and IV Dye Allergy (Severe, Verified 11/14/18 14:00)


   Anaphylaxis


peanut Allergy (Severe, Verified 11/14/18 14:00)


   Anaphylaxis


Penicillins Allergy (Severe, Verified 11/14/18 14:00)


   Anaphylaxis


shellfish derived Allergy (Severe, Verified 11/14/18 14:00)


   Anaphylaxis


strawberry Allergy (Severe, Verified 11/14/18 14:00)


   Anaphylaxis


amoxicillin Allergy (Intermediate, Verified 11/14/18 14:00)


   Vomiting


cefaclor [From Ceclor] Allergy (Verified 11/14/18 14:00)


   


clindamycin Allergy (Verified 11/14/18 14:00)


   


erythromycin base Allergy (Verified 11/14/18 14:00)


   


ketchup Allergy (Severe, Uncoded 11/14/18 14:00)


   Anaphylaxis











Past Medical History





- General


Information source: Patient





- Social History


Smoking Status: Current Every Day Smoker


Frequency of alcohol use: None


Drug Abuse: None


Family History: Reviewed & Not Pertinent


Patient has suicidal ideation: Yes


Patient has homicidal ideation: No


Pulmonary Medical History: Reports: Hx Asthma - Has not bothered her for years


Neurological Medical History: Reports: Hx Migraine - Occasional.  Denies: Hx 

Seizures


GI Medical History: Reports: Hx Gastroesophageal Reflux Disease


Skin Medical History: Reports Hx Eczema


Psychiatric Medical History: Reports: Hx Bipolar Disorder, Hx Depression


Infectious Medical History: Denies: Hx Hepatitis


Past Surgical History: Reports: Hx Nose Surgery, Hx Tonsillectomy, Other - Nasal

surgery





- Immunizations


Hx Diphtheria, Pertussis, Tetanus Vaccination: Yes





<LUDY CAICEDO - Last Filed: 01/25/19 10:21>





Review of Systems





<LUDY CAICEDO - Last Filed: 01/25/19 10:21>





- Review of Systems


Notes: 





REVIEW OF SYSTEMS:





CONSTITUTIONAL :  Denies fever.


  


EENT:   Denies eye, ear, nose or mouth or throat pain or other symptoms.





CARDIOVASCULAR:  Denies chest pain.





RESPIRATORY:  Denies cough, chest congestion, or shortness of breath.





GASTROINTESTINAL:  Denies abdominal pain or nausea, vomiting, or diarrhea.





GENITOURINARY:  Denies difficulty or painful urinating, urinary frequency, blood

in urine.





MUSCULOSKELETAL:  Denies back or neck pain.  Denies joint pain or swelling.





SKIN:   Denies rash or skin lesions.





NEUROLOGICAL:  Denies LOC or altered mental status.  Denies headache.  Denies 

sensory loss or motor deficits.





ALL OTHER SYSTEMS REVIEWED AND NEGATIVE. (LUDY CAICEDO)





Physical Exam





- Vital signs


Interpretation: Normal





<LUDY CAICEDO - Last Filed: 01/25/19 10:21>





- Vital signs


Vitals: 


                                        











Temp Pulse Resp BP Pulse Ox


 


 98.5 F   86   16   121/69   97 


 


 01/24/19 15:21  01/24/19 15:21  01/24/19 15:21  01/24/19 15:21  01/24/19 15:21











Notes: 





PHYSICAL EXAMINATION:





GENERAL: Well-appearing, in no acute distress.  Vital signs are all essentially 

normal.





HEAD: Atraumatic, normocephalic.





EYES: Pupils equal round and reactive to light, extraocular movements intact.





ENT: oropharynx clear without exudates.  Moist mucous membranes.





NECK: Normal range of motion, supple.





LUNGS: Breath sounds clear and equal bilaterally.





HEART: Regular rate and rhythm without murmurs.





ABDOMEN: Soft, nontender.  No guarding or rebound.  No masses.





BACK:  No tenderness throughout entire back.





EXTREMITIES: Normal range of motion without pain.





NEUROLOGICAL:  Normal speech, normal gait.  Normal sensory, motor, and reflex 

exams.  Awake, alert, and oriented x3.  Cranial nerves normal.





PSYCH: Somewhat depressed acting, but for the most part normal mood, normal 

affect.





SKIN: Warm, dry, no rashes. (LUDY CAICEDO)





Course





- Laboratory


Result Diagrams: 


                                 01/24/19 16:15





                                 01/24/19 16:15





<MORE NEWSOME - Last Filed: 01/25/19 10:01>





- Laboratory


Result Diagrams: 


                                 01/24/19 16:15





                                 01/24/19 16:15





<LUDY CAICEDO - Last Filed: 01/25/19 10:21>





- Re-evaluation


Re-evalutation: 





01/25/19 10:20


Morning rounds: Chart reviewed and patient interviewed.  Patient being evaluated

for suicidal ideation.  Says she is feeling better this morning.  Lab studies 

were all normal.  Vital signs are all normal.  Patient appears to be medically 

stable for transfer or discharge.


KOBI Caicedo MD


 (LUDY CAICEDO)





- Vital Signs


Vital signs: 


                                        











Temp Pulse Resp BP Pulse Ox


 


 98.1 F   74   20   118/66   100 


 


 01/25/19 06:37  01/25/19 06:37  01/25/19 06:37  01/25/19 06:37  01/25/19 06:37














- Laboratory


Laboratory results interpreted by me: 


                                        











  01/24/19





  16:15


 


Acetaminophen  < 10 L














Discharge





<MORE NEWSOME - Last Filed: 01/25/19 10:01>





<LUDY CAICEDO - Last Filed: 01/25/19 10:21>





- Discharge


Clinical Impression: 


 Suicidal ideation





Bipolar disorder


Qualifiers:


 Most recent bipolar episode type: most recent episode unspecified type 





Condition: Stable


Disposition: HOME, SELF-CARE


Additional Instructions: 


You have been evaluated both medical and behavioral health teams and been deemed

appropriate for discharge.  You have been provided prescriptions for Zyprexa 5 

mg twice daily and Cogentin 1 mg daily; please take as directed.  You are highly

encouraged to follow-up with outpatient substance abuse treatment in the form of

one-on-one therapy.  You have been provided a resource list of area providers 

including mobile crisis contact information.





DEPRESSION:


     Your evaluation reveals that you have mental depression. While symptoms may

be vague, they often include disturbance of sleep, fatigue, loss of appetite, 

and general loss of interest in life.  While depression may be a side effect of 

drugs, or a reaction to a major change in your life, many cases have no known 

cause.


     If depression is acute, and related to a major loss in your life, you can 

expect it to clear completely with time.  If you have been depressed a long 

time, are prone to repeated bouts of depression or low mood, or have been 

thinking of suicide, get help.


     Depression can be treated with anti-depressant medication and counselling. 

Long-term depression will often take a few weeks to clear, even with appropriate

medication.  Follow-up care is important.








SUICIDAL IDEATION:


     Suicidal ideation is a common medical term for thoughts about suicide, 

which may be as detailed as a formulated plan, without the suicidal act itself. 

Although most people who undergo suicidal ideation do not commit suicide, some 

go on to make suicide attempts. The range of suicidal ideation varies greatly 

from fleeting to detailed planning, role playing, and unsuccessful attempts.





     While thoughts about suicide are common, most people do not carry out 

serious actions to commit suicide.  Based upon your evaluation and discussion 

with you, we do not believe you are currently at risk to act upon your thoughts 

of suicide.  You have agreed to return to the Emergency Department, at any time,

if you feel inclined to act upon your suicidal thoughts.








FOLLOW-UP CARE:


If you experience worsening or a significant change in your symptoms, notify the

physician immediately or return to the Emergency Department at any time for re-

evaluation.





Prescriptions: 


Benztropine Mesylate [Cogentin 1 mg Tablet] 1 mg PO DAILY #7 tablet


Olanzapine [Zyprexa 5 mg Tablet] 5 mg PO BID #14 tablet


Referrals: 


[facility] [Outside] - Follow up as needed

## 2019-01-25 VITALS — SYSTOLIC BLOOD PRESSURE: 97 MMHG | DIASTOLIC BLOOD PRESSURE: 59 MMHG

## 2019-01-25 RX ADMIN — OLANZAPINE SCH MG: 5 TABLET, FILM COATED ORAL at 10:15

## 2019-01-25 RX ADMIN — BENZTROPINE MESYLATE SCH MG: 1 TABLET ORAL at 10:15

## 2019-01-25 NOTE — PSYCHOLOGICAL NOTE
Psych Note





- Psych Note


Date seen by psych provider: 01/25/19


Time seen by psych provider: 08:10


Psych Note: 


Reason for Consult: auditory hallucinations, suicidal ideation


Consent permission: Patient's sister Ashley, [phone]





This is a 23-year-old female who is accompanied by her older sister.  The 

patient does have a history of bipolar and borderline personality disorder 

(currently on no medicines for the last 2 months), history of alcohol and drug 

abuse (sober for the past year with brief relapse 2 months ago).





Check in conducted with patient


Patient disclosed that she is feeling well and much more relaxed today.  She 

reports that she still worries about relapse and knows that she needs to get in 

to services.  She reports that she had called many places and they could not 

locate somebody that took her insurance and reports that was the main reason why

[facility].  She continued to disclose that she is 

interested in possibly going to rehab and has been discussing that with her 

sisters.  Patient's mood is euthymic with congruent affect as evidenced by 

smiling and engaging with clinician.  Patient engaged in problem solving and 

demonstrated forward thinking as evidenced by discussing treatment options, 

returning to work, and continued sobriety.





Medication recommendations per [facility]'s contracted psychiatrist Dr. Mita TALBOT 

are as follows


Zyprexa 5 mg twice daily 


Cogentin 1 mg daily





296.80 (F31.9) unspecified bipolar and related disorder per history provided by 

patient


301.83 (F360.3) borderline personality disorder per history of by the patient's 

family





Impression\plan: Patient is cleared from acute psychiatric services.  Medication

adjustments have been made.  Patient  denies current suicidal ideation.  Openly 

discusses concerns with clinician in regards to relapse.  Patient has been 

provided resource list of area providers that includes mobile crisis contact 

information.  Patient is having difficulty finding provider that takes her 

insurance; behavioral health team confirmed the [facility] 

takes the patient's insurance and provides both inpatient and outpatient 

services.  Patient is recommended to follow-up with [facility] 

for continued assistance. Dr. Khanna was consulted and care management this 

patient; attending physicians in agreement with recommendations and disposition.